# Patient Record
Sex: MALE | Race: WHITE | NOT HISPANIC OR LATINO | Employment: OTHER | URBAN - NONMETROPOLITAN AREA
[De-identification: names, ages, dates, MRNs, and addresses within clinical notes are randomized per-mention and may not be internally consistent; named-entity substitution may affect disease eponyms.]

---

## 2022-02-26 ENCOUNTER — APPOINTMENT (OUTPATIENT)
Dept: MRI IMAGING | Facility: HOSPITAL | Age: 67
End: 2022-02-26

## 2022-02-26 ENCOUNTER — HOSPITAL ENCOUNTER (INPATIENT)
Facility: HOSPITAL | Age: 67
LOS: 5 days | Discharge: HOME | End: 2022-03-03
Attending: PSYCHIATRY & NEUROLOGY | Admitting: PSYCHIATRY & NEUROLOGY

## 2022-02-26 DIAGNOSIS — I63.319 CEREBROVASCULAR ACCIDENT (CVA) DUE TO THROMBOSIS OF MIDDLE CEREBRAL ARTERY, UNSPECIFIED BLOOD VESSEL LATERALITY: Primary | ICD-10-CM

## 2022-02-26 DIAGNOSIS — Z74.09 IMPAIRED MOBILITY AND ADLS: ICD-10-CM

## 2022-02-26 DIAGNOSIS — I67.858 OTHER HEREDITARY CEREBROVASCULAR DISEASE: ICD-10-CM

## 2022-02-26 DIAGNOSIS — Z74.09 IMPAIRED FUNCTIONAL MOBILITY, BALANCE, GAIT, AND ENDURANCE: ICD-10-CM

## 2022-02-26 DIAGNOSIS — Z78.9 IMPAIRED MOBILITY AND ADLS: ICD-10-CM

## 2022-02-26 DIAGNOSIS — R13.10 DYSPHAGIA, UNSPECIFIED TYPE: ICD-10-CM

## 2022-02-26 PROBLEM — E11.9 TYPE 2 DIABETES MELLITUS WITHOUT COMPLICATION, WITH LONG-TERM CURRENT USE OF INSULIN: Status: ACTIVE | Noted: 2022-02-26

## 2022-02-26 PROBLEM — F03.918 DEMENTIA WITH BEHAVIORAL PROBLEM: Status: ACTIVE | Noted: 2022-02-26

## 2022-02-26 PROBLEM — F41.9 ANXIETY DISORDER: Status: ACTIVE | Noted: 2022-02-26

## 2022-02-26 PROBLEM — F02.818 DEMENTIA ASSOCIATED WITH OTHER UNDERLYING DISEASE WITH BEHAVIORAL DISTURBANCE: Status: ACTIVE | Noted: 2022-02-26

## 2022-02-26 PROBLEM — Z79.4 TYPE 2 DIABETES MELLITUS WITHOUT COMPLICATION, WITH LONG-TERM CURRENT USE OF INSULIN: Status: ACTIVE | Noted: 2022-02-26

## 2022-02-26 PROBLEM — F06.2 PSYCHOTIC DISORDER DUE TO ANOTHER MEDICAL CONDITION WITH DELUSIONS: Status: ACTIVE | Noted: 2022-02-26

## 2022-02-26 PROBLEM — E78.5 HYPERLIPEMIA: Status: ACTIVE | Noted: 2022-02-26

## 2022-02-26 PROBLEM — Z62.819 HISTORY OF ABUSE IN CHILDHOOD: Status: ACTIVE | Noted: 2022-02-26

## 2022-02-26 PROBLEM — I10 HTN (HYPERTENSION): Status: ACTIVE | Noted: 2022-02-26

## 2022-02-26 LAB
BILIRUB UR QL STRIP: NEGATIVE
CHOLEST SERPL-MCNC: 156 MG/DL (ref 0–200)
CLARITY UR: CLEAR
COLOR UR: YELLOW
GLUCOSE BLDC GLUCOMTR-MCNC: 105 MG/DL (ref 70–130)
GLUCOSE BLDC GLUCOMTR-MCNC: 111 MG/DL (ref 70–130)
GLUCOSE BLDC GLUCOMTR-MCNC: 160 MG/DL (ref 70–130)
GLUCOSE BLDC GLUCOMTR-MCNC: 260 MG/DL (ref 70–130)
GLUCOSE P FAST SERPL-MCNC: 113 MG/DL (ref 74–106)
GLUCOSE UR STRIP-MCNC: ABNORMAL MG/DL
HDLC SERPL-MCNC: 35 MG/DL (ref 40–60)
HGB UR QL STRIP.AUTO: NEGATIVE
KETONES UR QL STRIP: NEGATIVE
LDLC SERPL CALC-MCNC: 101 MG/DL (ref 0–100)
LDLC/HDLC SERPL: 2.85 {RATIO}
LEUKOCYTE ESTERASE UR QL STRIP.AUTO: NEGATIVE
NITRITE UR QL STRIP: NEGATIVE
PH UR STRIP.AUTO: 6.5 [PH] (ref 5–9)
PROT UR QL STRIP: NEGATIVE
SP GR UR STRIP: 1.02 (ref 1–1.03)
T4 FREE SERPL-MCNC: 1.1 NG/DL (ref 0.93–1.7)
TRIGL SERPL-MCNC: 106 MG/DL (ref 0–150)
TSH SERPL DL<=0.05 MIU/L-ACNC: 1.7 UIU/ML (ref 0.27–4.2)
UROBILINOGEN UR QL STRIP: ABNORMAL
VLDLC SERPL-MCNC: 20 MG/DL (ref 5–40)

## 2022-02-26 PROCEDURE — 82962 GLUCOSE BLOOD TEST: CPT

## 2022-02-26 PROCEDURE — 80061 LIPID PANEL: CPT | Performed by: PSYCHIATRY & NEUROLOGY

## 2022-02-26 PROCEDURE — 63710000001 INSULIN ASPART PER 5 UNITS: Performed by: PSYCHIATRY & NEUROLOGY

## 2022-02-26 PROCEDURE — 84439 ASSAY OF FREE THYROXINE: CPT | Performed by: PSYCHIATRY & NEUROLOGY

## 2022-02-26 PROCEDURE — 81003 URINALYSIS AUTO W/O SCOPE: CPT | Performed by: PSYCHIATRY & NEUROLOGY

## 2022-02-26 PROCEDURE — 90846 FAMILY PSYTX W/O PT 50 MIN: CPT | Performed by: PSYCHIATRY & NEUROLOGY

## 2022-02-26 PROCEDURE — 84443 ASSAY THYROID STIM HORMONE: CPT | Performed by: PSYCHIATRY & NEUROLOGY

## 2022-02-26 PROCEDURE — 82947 ASSAY GLUCOSE BLOOD QUANT: CPT | Performed by: PSYCHIATRY & NEUROLOGY

## 2022-02-26 PROCEDURE — 70551 MRI BRAIN STEM W/O DYE: CPT

## 2022-02-26 PROCEDURE — 99223 1ST HOSP IP/OBS HIGH 75: CPT | Performed by: PSYCHIATRY & NEUROLOGY

## 2022-02-26 RX ORDER — MEMANTINE HYDROCHLORIDE 10 MG/1
10 TABLET ORAL 2 TIMES DAILY
COMMUNITY

## 2022-02-26 RX ORDER — LISINOPRIL 10 MG/1
10 TABLET ORAL DAILY
COMMUNITY
End: 2022-03-10 | Stop reason: HOSPADM

## 2022-02-26 RX ORDER — NICOTINE POLACRILEX 4 MG
15 LOZENGE BUCCAL
Status: DISCONTINUED | OUTPATIENT
Start: 2022-02-26 | End: 2022-03-03 | Stop reason: HOSPADM

## 2022-02-26 RX ORDER — GLIPIZIDE 5 MG/1
5 TABLET ORAL
Status: DISCONTINUED | OUTPATIENT
Start: 2022-02-26 | End: 2022-03-03 | Stop reason: HOSPADM

## 2022-02-26 RX ORDER — CETIRIZINE HYDROCHLORIDE 10 MG/1
10 TABLET ORAL DAILY
COMMUNITY
End: 2022-03-03 | Stop reason: HOSPADM

## 2022-02-26 RX ORDER — GLIPIZIDE 5 MG/1
5 TABLET ORAL
COMMUNITY
End: 2022-03-10 | Stop reason: HOSPADM

## 2022-02-26 RX ORDER — SERTRALINE HYDROCHLORIDE 25 MG/1
25 TABLET, FILM COATED ORAL DAILY
COMMUNITY
End: 2022-03-03 | Stop reason: HOSPADM

## 2022-02-26 RX ORDER — ASPIRIN 81 MG/1
81 TABLET, CHEWABLE ORAL DAILY
Status: DISCONTINUED | OUTPATIENT
Start: 2022-02-26 | End: 2022-03-03 | Stop reason: HOSPADM

## 2022-02-26 RX ORDER — ATORVASTATIN CALCIUM 20 MG/1
20 TABLET, FILM COATED ORAL DAILY
COMMUNITY
End: 2022-03-03 | Stop reason: HOSPADM

## 2022-02-26 RX ORDER — INSULIN GLARGINE 100 [IU]/ML
25 INJECTION, SOLUTION SUBCUTANEOUS NIGHTLY
COMMUNITY
End: 2022-03-03 | Stop reason: HOSPADM

## 2022-02-26 RX ORDER — ALUMINA, MAGNESIA, AND SIMETHICONE 2400; 2400; 240 MG/30ML; MG/30ML; MG/30ML
15 SUSPENSION ORAL EVERY 6 HOURS PRN
Status: DISCONTINUED | OUTPATIENT
Start: 2022-02-26 | End: 2022-03-03 | Stop reason: HOSPADM

## 2022-02-26 RX ORDER — LANOLIN ALCOHOL/MO/W.PET/CERES
1.5 CREAM (GRAM) TOPICAL NIGHTLY
Status: DISCONTINUED | OUTPATIENT
Start: 2022-02-26 | End: 2022-03-03 | Stop reason: HOSPADM

## 2022-02-26 RX ORDER — ONDANSETRON 4 MG/1
4 TABLET, FILM COATED ORAL EVERY 6 HOURS PRN
Status: DISCONTINUED | OUTPATIENT
Start: 2022-02-26 | End: 2022-03-03 | Stop reason: HOSPADM

## 2022-02-26 RX ORDER — LOPERAMIDE HYDROCHLORIDE 2 MG/1
2 CAPSULE ORAL
Status: DISCONTINUED | OUTPATIENT
Start: 2022-02-26 | End: 2022-03-03 | Stop reason: HOSPADM

## 2022-02-26 RX ORDER — INSULIN GLARGINE 100 [IU]/ML
20 INJECTION, SOLUTION SUBCUTANEOUS DAILY
COMMUNITY
End: 2022-03-03 | Stop reason: HOSPADM

## 2022-02-26 RX ORDER — SERTRALINE HYDROCHLORIDE 25 MG/1
25 TABLET, FILM COATED ORAL DAILY
Status: DISCONTINUED | OUTPATIENT
Start: 2022-02-26 | End: 2022-03-01

## 2022-02-26 RX ORDER — ASPIRIN 81 MG/1
81 TABLET, CHEWABLE ORAL DAILY
COMMUNITY

## 2022-02-26 RX ORDER — CLONIDINE HYDROCHLORIDE 0.1 MG/1
0.1 TABLET ORAL EVERY 4 HOURS PRN
Status: DISCONTINUED | OUTPATIENT
Start: 2022-02-26 | End: 2022-03-03 | Stop reason: HOSPADM

## 2022-02-26 RX ORDER — LORAZEPAM 2 MG/1
2 TABLET ORAL
COMMUNITY
End: 2022-03-03 | Stop reason: HOSPADM

## 2022-02-26 RX ORDER — CANAGLIFLOZIN 300 MG/1
300 TABLET, FILM COATED ORAL DAILY
Status: ON HOLD | COMMUNITY
End: 2022-03-07

## 2022-02-26 RX ORDER — MEMANTINE HYDROCHLORIDE 5 MG/1
10 TABLET ORAL EVERY 12 HOURS SCHEDULED
Status: DISCONTINUED | OUTPATIENT
Start: 2022-02-26 | End: 2022-03-03 | Stop reason: HOSPADM

## 2022-02-26 RX ORDER — ATORVASTATIN CALCIUM 20 MG/1
20 TABLET, FILM COATED ORAL DAILY
Status: DISCONTINUED | OUTPATIENT
Start: 2022-02-26 | End: 2022-02-28

## 2022-02-26 RX ORDER — RISPERIDONE 0.25 MG/1
0.25 TABLET ORAL NIGHTLY
Status: DISCONTINUED | OUTPATIENT
Start: 2022-02-26 | End: 2022-03-03 | Stop reason: HOSPADM

## 2022-02-26 RX ORDER — DONEPEZIL HYDROCHLORIDE 10 MG/1
10 TABLET, FILM COATED ORAL DAILY
COMMUNITY

## 2022-02-26 RX ORDER — DONEPEZIL HYDROCHLORIDE 10 MG/1
10 TABLET, FILM COATED ORAL DAILY
Status: DISCONTINUED | OUTPATIENT
Start: 2022-02-26 | End: 2022-03-03 | Stop reason: HOSPADM

## 2022-02-26 RX ORDER — LISINOPRIL 10 MG/1
10 TABLET ORAL DAILY
Status: DISCONTINUED | OUTPATIENT
Start: 2022-02-26 | End: 2022-03-03 | Stop reason: HOSPADM

## 2022-02-26 RX ORDER — DEXTROSE MONOHYDRATE 25 G/50ML
25 INJECTION, SOLUTION INTRAVENOUS
Status: DISCONTINUED | OUTPATIENT
Start: 2022-02-26 | End: 2022-03-03 | Stop reason: HOSPADM

## 2022-02-26 RX ADMIN — ATORVASTATIN CALCIUM 20 MG: 20 TABLET, FILM COATED ORAL at 14:41

## 2022-02-26 RX ADMIN — ASPIRIN 81 MG: 81 TABLET, CHEWABLE ORAL at 14:41

## 2022-02-26 RX ADMIN — DONEPEZIL HYDROCHLORIDE 10 MG: 10 TABLET, FILM COATED ORAL at 14:41

## 2022-02-26 RX ADMIN — MEMANTINE 10 MG: 5 TABLET ORAL at 14:41

## 2022-02-26 RX ADMIN — METFORMIN HYDROCHLORIDE 1000 MG: 500 TABLET ORAL at 17:27

## 2022-02-26 RX ADMIN — RISPERIDONE 0.25 MG: 0.25 TABLET ORAL at 21:03

## 2022-02-26 RX ADMIN — INSULIN ASPART 2 UNITS: 100 INJECTION, SOLUTION INTRAVENOUS; SUBCUTANEOUS at 17:27

## 2022-02-26 RX ADMIN — GLIPIZIDE 5 MG: 5 TABLET ORAL at 17:27

## 2022-02-26 RX ADMIN — INSULIN ASPART 6 UNITS: 100 INJECTION, SOLUTION INTRAVENOUS; SUBCUTANEOUS at 14:44

## 2022-02-26 RX ADMIN — SERTRALINE HYDROCHLORIDE 25 MG: 25 TABLET ORAL at 14:41

## 2022-02-26 RX ADMIN — MELATONIN 1.5 MG: 3 TAB ORAL at 21:03

## 2022-02-26 RX ADMIN — CANAGLIFLOZIN 300 MG: 300 TABLET, FILM COATED ORAL at 14:43

## 2022-02-26 RX ADMIN — LISINOPRIL 10 MG: 10 TABLET ORAL at 14:41

## 2022-02-26 RX ADMIN — MEMANTINE 10 MG: 5 TABLET ORAL at 21:03

## 2022-02-27 ENCOUNTER — APPOINTMENT (OUTPATIENT)
Dept: CT IMAGING | Facility: HOSPITAL | Age: 67
End: 2022-02-27

## 2022-02-27 LAB
25(OH)D3 SERPL-MCNC: 12.7 NG/ML (ref 30–100)
ALBUMIN SERPL-MCNC: 4.1 G/DL (ref 3.5–5.2)
ALBUMIN/GLOB SERPL: 1.8 G/DL
ALP SERPL-CCNC: 138 U/L (ref 39–117)
ALT SERPL W P-5'-P-CCNC: 25 U/L (ref 1–41)
ANION GAP SERPL CALCULATED.3IONS-SCNC: 12 MMOL/L (ref 5–15)
AST SERPL-CCNC: 30 U/L (ref 1–40)
BASOPHILS # BLD AUTO: 0.07 10*3/MM3 (ref 0–0.2)
BASOPHILS NFR BLD AUTO: 1 % (ref 0–1.5)
BILIRUB SERPL-MCNC: 0.7 MG/DL (ref 0–1.2)
BUN SERPL-MCNC: 12 MG/DL (ref 8–23)
BUN/CREAT SERPL: 15.8 (ref 7–25)
CALCIUM SPEC-SCNC: 9.6 MG/DL (ref 8.6–10.5)
CHLORIDE SERPL-SCNC: 99 MMOL/L (ref 98–107)
CO2 SERPL-SCNC: 27 MMOL/L (ref 22–29)
CREAT SERPL-MCNC: 0.76 MG/DL (ref 0.76–1.27)
DEPRECATED RDW RBC AUTO: 39.2 FL (ref 37–54)
EOSINOPHIL # BLD AUTO: 0.39 10*3/MM3 (ref 0–0.4)
EOSINOPHIL NFR BLD AUTO: 5.4 % (ref 0.3–6.2)
ERYTHROCYTE [DISTWIDTH] IN BLOOD BY AUTOMATED COUNT: 12.6 % (ref 12.3–15.4)
FOLATE SERPL-MCNC: 10.8 NG/ML (ref 4.78–24.2)
GFR SERPL CREATININE-BSD FRML MDRD: 103 ML/MIN/1.73
GLOBULIN UR ELPH-MCNC: 2.3 GM/DL
GLUCOSE BLDC GLUCOMTR-MCNC: 100 MG/DL (ref 70–130)
GLUCOSE BLDC GLUCOMTR-MCNC: 154 MG/DL (ref 70–130)
GLUCOSE BLDC GLUCOMTR-MCNC: 186 MG/DL (ref 70–130)
GLUCOSE BLDC GLUCOMTR-MCNC: 211 MG/DL (ref 70–130)
GLUCOSE SERPL-MCNC: 106 MG/DL (ref 65–99)
HCT VFR BLD AUTO: 42.7 % (ref 37.5–51)
HGB BLD-MCNC: 15 G/DL (ref 13–17.7)
IMM GRANULOCYTES # BLD AUTO: 0.03 10*3/MM3 (ref 0–0.05)
IMM GRANULOCYTES NFR BLD AUTO: 0.4 % (ref 0–0.5)
LYMPHOCYTES # BLD AUTO: 1.56 10*3/MM3 (ref 0.7–3.1)
LYMPHOCYTES NFR BLD AUTO: 21.4 % (ref 19.6–45.3)
MCH RBC QN AUTO: 30.2 PG (ref 26.6–33)
MCHC RBC AUTO-ENTMCNC: 35.1 G/DL (ref 31.5–35.7)
MCV RBC AUTO: 86.1 FL (ref 79–97)
MONOCYTES # BLD AUTO: 0.78 10*3/MM3 (ref 0.1–0.9)
MONOCYTES NFR BLD AUTO: 10.7 % (ref 5–12)
NEUTROPHILS NFR BLD AUTO: 4.45 10*3/MM3 (ref 1.7–7)
NEUTROPHILS NFR BLD AUTO: 61.1 % (ref 42.7–76)
NRBC BLD AUTO-RTO: 0 /100 WBC (ref 0–0.2)
PLATELET # BLD AUTO: 290 10*3/MM3 (ref 140–450)
PMV BLD AUTO: 9.4 FL (ref 6–12)
POTASSIUM SERPL-SCNC: 4.8 MMOL/L (ref 3.5–5.2)
PROT SERPL-MCNC: 6.4 G/DL (ref 6–8.5)
RBC # BLD AUTO: 4.96 10*6/MM3 (ref 4.14–5.8)
SODIUM SERPL-SCNC: 138 MMOL/L (ref 136–145)
VIT B12 BLD-MCNC: 1119 PG/ML (ref 211–946)
WBC NRBC COR # BLD: 7.28 10*3/MM3 (ref 3.4–10.8)

## 2022-02-27 PROCEDURE — 82746 ASSAY OF FOLIC ACID SERUM: CPT | Performed by: PSYCHIATRY & NEUROLOGY

## 2022-02-27 PROCEDURE — 82607 VITAMIN B-12: CPT | Performed by: PSYCHIATRY & NEUROLOGY

## 2022-02-27 PROCEDURE — 85025 COMPLETE CBC W/AUTO DIFF WBC: CPT | Performed by: PSYCHIATRY & NEUROLOGY

## 2022-02-27 PROCEDURE — 82962 GLUCOSE BLOOD TEST: CPT

## 2022-02-27 PROCEDURE — 80053 COMPREHEN METABOLIC PANEL: CPT | Performed by: PSYCHIATRY & NEUROLOGY

## 2022-02-27 PROCEDURE — 83036 HEMOGLOBIN GLYCOSYLATED A1C: CPT | Performed by: NURSE PRACTITIONER

## 2022-02-27 PROCEDURE — 99232 SBSQ HOSP IP/OBS MODERATE 35: CPT | Performed by: PSYCHIATRY & NEUROLOGY

## 2022-02-27 PROCEDURE — 70450 CT HEAD/BRAIN W/O DYE: CPT

## 2022-02-27 PROCEDURE — 82306 VITAMIN D 25 HYDROXY: CPT | Performed by: PSYCHIATRY & NEUROLOGY

## 2022-02-27 PROCEDURE — 80061 LIPID PANEL: CPT | Performed by: NURSE PRACTITIONER

## 2022-02-27 PROCEDURE — 63710000001 INSULIN ASPART PER 5 UNITS: Performed by: PSYCHIATRY & NEUROLOGY

## 2022-02-27 RX ADMIN — MEMANTINE 10 MG: 5 TABLET ORAL at 08:46

## 2022-02-27 RX ADMIN — DONEPEZIL HYDROCHLORIDE 10 MG: 10 TABLET, FILM COATED ORAL at 08:46

## 2022-02-27 RX ADMIN — RISPERIDONE 0.25 MG: 0.25 TABLET ORAL at 20:36

## 2022-02-27 RX ADMIN — GLIPIZIDE 5 MG: 5 TABLET ORAL at 08:46

## 2022-02-27 RX ADMIN — GLIPIZIDE 5 MG: 5 TABLET ORAL at 17:17

## 2022-02-27 RX ADMIN — CANAGLIFLOZIN 300 MG: 300 TABLET, FILM COATED ORAL at 08:52

## 2022-02-27 RX ADMIN — MELATONIN 1.5 MG: 3 TAB ORAL at 20:36

## 2022-02-27 RX ADMIN — INSULIN ASPART 4 UNITS: 100 INJECTION, SOLUTION INTRAVENOUS; SUBCUTANEOUS at 11:38

## 2022-02-27 RX ADMIN — MEMANTINE 10 MG: 5 TABLET ORAL at 20:36

## 2022-02-27 RX ADMIN — ATORVASTATIN CALCIUM 20 MG: 20 TABLET, FILM COATED ORAL at 08:46

## 2022-02-27 RX ADMIN — METFORMIN HYDROCHLORIDE 1000 MG: 500 TABLET ORAL at 08:45

## 2022-02-27 RX ADMIN — ASPIRIN 81 MG: 81 TABLET, CHEWABLE ORAL at 08:46

## 2022-02-27 RX ADMIN — OFLOXACIN 50000 UNITS: 300 TABLET, COATED ORAL at 17:17

## 2022-02-27 RX ADMIN — SERTRALINE HYDROCHLORIDE 25 MG: 25 TABLET ORAL at 08:46

## 2022-02-27 RX ADMIN — METFORMIN HYDROCHLORIDE 1000 MG: 500 TABLET ORAL at 17:17

## 2022-02-27 RX ADMIN — LISINOPRIL 10 MG: 10 TABLET ORAL at 08:46

## 2022-02-28 ENCOUNTER — APPOINTMENT (OUTPATIENT)
Dept: MRI IMAGING | Facility: HOSPITAL | Age: 67
End: 2022-02-28

## 2022-02-28 ENCOUNTER — APPOINTMENT (OUTPATIENT)
Dept: CT IMAGING | Facility: HOSPITAL | Age: 67
End: 2022-02-28

## 2022-02-28 LAB
BH CV ECHO MEAS - ACS: 2 CM
BH CV ECHO MEAS - AO MAX PG (FULL): 1.9 MMHG
BH CV ECHO MEAS - AO MAX PG: 4.2 MMHG
BH CV ECHO MEAS - AO MEAN PG (FULL): 1 MMHG
BH CV ECHO MEAS - AO MEAN PG: 2 MMHG
BH CV ECHO MEAS - AO ROOT AREA (BSA CORRECTED): 1.9
BH CV ECHO MEAS - AO ROOT AREA: 10.2 CM^2
BH CV ECHO MEAS - AO ROOT DIAM: 3.6 CM
BH CV ECHO MEAS - AO V2 MAX: 102 CM/SEC
BH CV ECHO MEAS - AO V2 MEAN: 69.1 CM/SEC
BH CV ECHO MEAS - AO V2 VTI: 14.1 CM
BH CV ECHO MEAS - ASC AORTA: 3.1 CM
BH CV ECHO MEAS - AVA(I,A): 2.3 CM^2
BH CV ECHO MEAS - AVA(I,D): 2.3 CM^2
BH CV ECHO MEAS - AVA(V,A): 2.3 CM^2
BH CV ECHO MEAS - AVA(V,D): 2.3 CM^2
BH CV ECHO MEAS - BSA(HAYCOCK): 1.9 M^2
BH CV ECHO MEAS - BSA: 1.9 M^2
BH CV ECHO MEAS - BZI_BMI: 26.6 KILOGRAMS/M^2
BH CV ECHO MEAS - BZI_METRIC_HEIGHT: 170.2 CM
BH CV ECHO MEAS - BZI_METRIC_WEIGHT: 77.1 KG
BH CV ECHO MEAS - EDV(CUBED): 85.8 ML
BH CV ECHO MEAS - EDV(MOD-SP2): 87.4 ML
BH CV ECHO MEAS - EDV(MOD-SP4): 99.7 ML
BH CV ECHO MEAS - EDV(TEICH): 88.2 ML
BH CV ECHO MEAS - EF(CUBED): 75.8 %
BH CV ECHO MEAS - EF(MOD-SP2): 59.6 %
BH CV ECHO MEAS - EF(MOD-SP4): 67.6 %
BH CV ECHO MEAS - EF(TEICH): 67.9 %
BH CV ECHO MEAS - ESV(CUBED): 20.8 ML
BH CV ECHO MEAS - ESV(MOD-SP2): 35.3 ML
BH CV ECHO MEAS - ESV(MOD-SP4): 32.3 ML
BH CV ECHO MEAS - ESV(TEICH): 28.3 ML
BH CV ECHO MEAS - FS: 37.6 %
BH CV ECHO MEAS - IVS/LVPW: 1.1
BH CV ECHO MEAS - IVSD: 1.2 CM
BH CV ECHO MEAS - LA DIMENSION: 3.7 CM
BH CV ECHO MEAS - LA/AO: 1
BH CV ECHO MEAS - LV DIASTOLIC VOL/BSA (35-75): 52.8 ML/M^2
BH CV ECHO MEAS - LV MASS(C)D: 167.4 GRAMS
BH CV ECHO MEAS - LV MASS(C)DI: 88.7 GRAMS/M^2
BH CV ECHO MEAS - LV MAX PG: 2.3 MMHG
BH CV ECHO MEAS - LV MEAN PG: 1 MMHG
BH CV ECHO MEAS - LV SYSTOLIC VOL/BSA (12-30): 17.1 ML/M^2
BH CV ECHO MEAS - LV V1 MAX: 75.7 CM/SEC
BH CV ECHO MEAS - LV V1 MEAN: 54.5 CM/SEC
BH CV ECHO MEAS - LV V1 VTI: 10.5 CM
BH CV ECHO MEAS - LVIDD: 4.4 CM
BH CV ECHO MEAS - LVIDS: 2.8 CM
BH CV ECHO MEAS - LVLD AP2: 7.6 CM
BH CV ECHO MEAS - LVLD AP4: 7.4 CM
BH CV ECHO MEAS - LVLS AP2: 6 CM
BH CV ECHO MEAS - LVLS AP4: 6 CM
BH CV ECHO MEAS - LVOT AREA (M): 3.1 CM^2
BH CV ECHO MEAS - LVOT AREA: 3.1 CM^2
BH CV ECHO MEAS - LVOT DIAM: 2 CM
BH CV ECHO MEAS - LVPWD: 1 CM
BH CV ECHO MEAS - MV A MAX VEL: 83.1 CM/SEC
BH CV ECHO MEAS - MV DEC SLOPE: 395 CM/SEC^2
BH CV ECHO MEAS - MV E MAX VEL: 53.6 CM/SEC
BH CV ECHO MEAS - MV E/A: 0.65
BH CV ECHO MEAS - MV MAX PG: 5.4 MMHG
BH CV ECHO MEAS - MV MEAN PG: 2 MMHG
BH CV ECHO MEAS - MV P1/2T MAX VEL: 83 CM/SEC
BH CV ECHO MEAS - MV P1/2T: 61.5 MSEC
BH CV ECHO MEAS - MV V2 MAX: 116 CM/SEC
BH CV ECHO MEAS - MV V2 MEAN: 63.7 CM/SEC
BH CV ECHO MEAS - MV V2 VTI: 18.5 CM
BH CV ECHO MEAS - MVA P1/2T LCG: 2.7 CM^2
BH CV ECHO MEAS - MVA(P1/2T): 3.6 CM^2
BH CV ECHO MEAS - MVA(VTI): 1.8 CM^2
BH CV ECHO MEAS - PA MAX PG: 3.8 MMHG
BH CV ECHO MEAS - PA V2 MAX: 97.7 CM/SEC
BH CV ECHO MEAS - RAP SYSTOLE: 10 MMHG
BH CV ECHO MEAS - RVDD: 2.9 CM
BH CV ECHO MEAS - RVSP: 28.8 MMHG
BH CV ECHO MEAS - SI(AO): 76.1 ML/M^2
BH CV ECHO MEAS - SI(CUBED): 34.4 ML/M^2
BH CV ECHO MEAS - SI(LVOT): 17.5 ML/M^2
BH CV ECHO MEAS - SI(MOD-SP2): 27.6 ML/M^2
BH CV ECHO MEAS - SI(MOD-SP4): 35.7 ML/M^2
BH CV ECHO MEAS - SI(TEICH): 31.7 ML/M^2
BH CV ECHO MEAS - SV(AO): 143.5 ML
BH CV ECHO MEAS - SV(CUBED): 65 ML
BH CV ECHO MEAS - SV(LVOT): 33 ML
BH CV ECHO MEAS - SV(MOD-SP2): 52.1 ML
BH CV ECHO MEAS - SV(MOD-SP4): 67.4 ML
BH CV ECHO MEAS - SV(TEICH): 59.9 ML
BH CV ECHO MEAS - TR MAX VEL: 217 CM/SEC
CHOLEST SERPL-MCNC: 144 MG/DL (ref 0–200)
GLUCOSE BLDC GLUCOMTR-MCNC: 145 MG/DL (ref 70–130)
GLUCOSE BLDC GLUCOMTR-MCNC: 171 MG/DL (ref 70–130)
GLUCOSE BLDC GLUCOMTR-MCNC: 177 MG/DL (ref 70–130)
GLUCOSE BLDC GLUCOMTR-MCNC: 305 MG/DL (ref 70–130)
HBA1C MFR BLD: 7.5 % (ref 4.8–5.6)
HDLC SERPL-MCNC: 30 MG/DL (ref 40–60)
LDLC SERPL CALC-MCNC: 94 MG/DL (ref 0–100)
LDLC/HDLC SERPL: 3.08 {RATIO}
LV EF 2D ECHO EST: 58 %
MAXIMAL PREDICTED HEART RATE: 156 BPM
STRESS TARGET HR: 133 BPM
TRIGL SERPL-MCNC: 108 MG/DL (ref 0–150)
VLDLC SERPL-MCNC: 20 MG/DL (ref 5–40)

## 2022-02-28 PROCEDURE — 70498 CT ANGIOGRAPHY NECK: CPT

## 2022-02-28 PROCEDURE — 25010000002 GADOTERIDOL PER 1 ML: Performed by: PSYCHIATRY & NEUROLOGY

## 2022-02-28 PROCEDURE — 70496 CT ANGIOGRAPHY HEAD: CPT

## 2022-02-28 PROCEDURE — 0 IOPAMIDOL PER 1 ML: Performed by: PSYCHIATRY & NEUROLOGY

## 2022-02-28 PROCEDURE — 70553 MRI BRAIN STEM W/O & W/DYE: CPT

## 2022-02-28 PROCEDURE — A9579 GAD-BASE MR CONTRAST NOS,1ML: HCPCS | Performed by: PSYCHIATRY & NEUROLOGY

## 2022-02-28 PROCEDURE — 63710000001 INSULIN ASPART PER 5 UNITS: Performed by: PSYCHIATRY & NEUROLOGY

## 2022-02-28 PROCEDURE — 99232 SBSQ HOSP IP/OBS MODERATE 35: CPT | Performed by: PSYCHIATRY & NEUROLOGY

## 2022-02-28 PROCEDURE — 99223 1ST HOSP IP/OBS HIGH 75: CPT | Performed by: NURSE PRACTITIONER

## 2022-02-28 PROCEDURE — 82962 GLUCOSE BLOOD TEST: CPT

## 2022-02-28 PROCEDURE — 92610 EVALUATE SWALLOWING FUNCTION: CPT | Performed by: SPEECH-LANGUAGE PATHOLOGIST

## 2022-02-28 RX ORDER — CLOPIDOGREL BISULFATE 75 MG/1
75 TABLET ORAL DAILY
Status: DISCONTINUED | OUTPATIENT
Start: 2022-02-28 | End: 2022-03-03 | Stop reason: HOSPADM

## 2022-02-28 RX ORDER — SODIUM CHLORIDE 0.9 % (FLUSH) 0.9 %
10 SYRINGE (ML) INJECTION EVERY 12 HOURS SCHEDULED
Status: DISCONTINUED | OUTPATIENT
Start: 2022-02-28 | End: 2022-02-28

## 2022-02-28 RX ORDER — ATORVASTATIN CALCIUM 40 MG/1
40 TABLET, FILM COATED ORAL DAILY
Status: DISCONTINUED | OUTPATIENT
Start: 2022-03-01 | End: 2022-03-03 | Stop reason: HOSPADM

## 2022-02-28 RX ORDER — SODIUM CHLORIDE 0.9 % (FLUSH) 0.9 %
10 SYRINGE (ML) INJECTION AS NEEDED
Status: DISCONTINUED | OUTPATIENT
Start: 2022-02-28 | End: 2022-02-28

## 2022-02-28 RX ADMIN — METFORMIN HYDROCHLORIDE 1000 MG: 500 TABLET ORAL at 08:24

## 2022-02-28 RX ADMIN — MEMANTINE 10 MG: 5 TABLET ORAL at 20:21

## 2022-02-28 RX ADMIN — LISINOPRIL 10 MG: 10 TABLET ORAL at 08:24

## 2022-02-28 RX ADMIN — RISPERIDONE 0.25 MG: 0.25 TABLET ORAL at 20:21

## 2022-02-28 RX ADMIN — ATORVASTATIN CALCIUM 20 MG: 20 TABLET, FILM COATED ORAL at 08:24

## 2022-02-28 RX ADMIN — MEMANTINE 10 MG: 5 TABLET ORAL at 08:24

## 2022-02-28 RX ADMIN — DONEPEZIL HYDROCHLORIDE 10 MG: 10 TABLET, FILM COATED ORAL at 08:24

## 2022-02-28 RX ADMIN — ASPIRIN 81 MG: 81 TABLET, CHEWABLE ORAL at 08:24

## 2022-02-28 RX ADMIN — INSULIN ASPART 2 UNITS: 100 INJECTION, SOLUTION INTRAVENOUS; SUBCUTANEOUS at 11:53

## 2022-02-28 RX ADMIN — SERTRALINE HYDROCHLORIDE 25 MG: 25 TABLET ORAL at 08:24

## 2022-02-28 RX ADMIN — GADOTERIDOL 17 ML: 279.3 INJECTION, SOLUTION INTRAVENOUS at 09:33

## 2022-02-28 RX ADMIN — MELATONIN 1.5 MG: 3 TAB ORAL at 20:21

## 2022-02-28 RX ADMIN — IOPAMIDOL 90 ML: 755 INJECTION, SOLUTION INTRAVENOUS at 12:52

## 2022-02-28 RX ADMIN — INSULIN ASPART 2 UNITS: 100 INJECTION, SOLUTION INTRAVENOUS; SUBCUTANEOUS at 08:23

## 2022-02-28 RX ADMIN — INSULIN ASPART 2 UNITS: 100 INJECTION, SOLUTION INTRAVENOUS; SUBCUTANEOUS at 16:57

## 2022-02-28 RX ADMIN — GLIPIZIDE 5 MG: 5 TABLET ORAL at 17:01

## 2022-02-28 RX ADMIN — CLOPIDOGREL 75 MG: 75 TABLET, FILM COATED ORAL at 15:50

## 2022-02-28 RX ADMIN — CANAGLIFLOZIN 300 MG: 300 TABLET, FILM COATED ORAL at 08:24

## 2022-02-28 RX ADMIN — GLIPIZIDE 5 MG: 5 TABLET ORAL at 08:24

## 2022-03-01 LAB
AMMONIA BLD-SCNC: 20 UMOL/L (ref 16–60)
GLUCOSE BLDC GLUCOMTR-MCNC: 155 MG/DL (ref 70–130)
GLUCOSE BLDC GLUCOMTR-MCNC: 192 MG/DL (ref 70–130)
GLUCOSE BLDC GLUCOMTR-MCNC: 328 MG/DL (ref 70–130)
HIV1+2 AB SER QL: NORMAL
RPR SER QL: NORMAL

## 2022-03-01 PROCEDURE — G0432 EIA HIV-1/HIV-2 SCREEN: HCPCS | Performed by: PSYCHIATRY & NEUROLOGY

## 2022-03-01 PROCEDURE — 82962 GLUCOSE BLOOD TEST: CPT

## 2022-03-01 PROCEDURE — 86592 SYPHILIS TEST NON-TREP QUAL: CPT | Performed by: PSYCHIATRY & NEUROLOGY

## 2022-03-01 PROCEDURE — 99232 SBSQ HOSP IP/OBS MODERATE 35: CPT | Performed by: PSYCHIATRY & NEUROLOGY

## 2022-03-01 PROCEDURE — 99232 SBSQ HOSP IP/OBS MODERATE 35: CPT | Performed by: NURSE PRACTITIONER

## 2022-03-01 PROCEDURE — 82140 ASSAY OF AMMONIA: CPT | Performed by: PSYCHIATRY & NEUROLOGY

## 2022-03-01 PROCEDURE — 63710000001 INSULIN ASPART PER 5 UNITS: Performed by: PSYCHIATRY & NEUROLOGY

## 2022-03-01 PROCEDURE — 97166 OT EVAL MOD COMPLEX 45 MIN: CPT

## 2022-03-01 PROCEDURE — 97162 PT EVAL MOD COMPLEX 30 MIN: CPT

## 2022-03-01 RX ADMIN — LISINOPRIL 10 MG: 10 TABLET ORAL at 10:52

## 2022-03-01 RX ADMIN — ASPIRIN 81 MG: 81 TABLET, CHEWABLE ORAL at 08:28

## 2022-03-01 RX ADMIN — ATORVASTATIN CALCIUM 40 MG: 40 TABLET, FILM COATED ORAL at 08:28

## 2022-03-01 RX ADMIN — RISPERIDONE 0.25 MG: 0.25 TABLET ORAL at 20:13

## 2022-03-01 RX ADMIN — INSULIN ASPART 2 UNITS: 100 INJECTION, SOLUTION INTRAVENOUS; SUBCUTANEOUS at 11:54

## 2022-03-01 RX ADMIN — GLIPIZIDE 5 MG: 5 TABLET ORAL at 16:39

## 2022-03-01 RX ADMIN — MEMANTINE 10 MG: 5 TABLET ORAL at 08:28

## 2022-03-01 RX ADMIN — GLIPIZIDE 5 MG: 5 TABLET ORAL at 08:28

## 2022-03-01 RX ADMIN — INSULIN ASPART 2 UNITS: 100 INJECTION, SOLUTION INTRAVENOUS; SUBCUTANEOUS at 16:39

## 2022-03-01 RX ADMIN — MELATONIN 1.5 MG: 3 TAB ORAL at 20:13

## 2022-03-01 RX ADMIN — DONEPEZIL HYDROCHLORIDE 10 MG: 10 TABLET, FILM COATED ORAL at 08:28

## 2022-03-01 RX ADMIN — MEMANTINE 10 MG: 5 TABLET ORAL at 20:13

## 2022-03-01 RX ADMIN — SERTRALINE HYDROCHLORIDE 25 MG: 25 TABLET ORAL at 08:28

## 2022-03-01 RX ADMIN — INSULIN ASPART 7 UNITS: 100 INJECTION, SOLUTION INTRAVENOUS; SUBCUTANEOUS at 08:29

## 2022-03-01 RX ADMIN — CANAGLIFLOZIN 300 MG: 300 TABLET, FILM COATED ORAL at 08:28

## 2022-03-01 RX ADMIN — CLOPIDOGREL 75 MG: 75 TABLET, FILM COATED ORAL at 08:28

## 2022-03-01 NOTE — PROGRESS NOTES
[facility] They report they will follow with patient for up to 8 hours a week once patient is discharged.
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
## 2022-03-01 NOTE — NURSING NOTE
"Pt seen in the common area this am. Pt is alert to person only. Pt is rambling nonsensically and states he needs different clothes to keep \"the crazy people away\". Meds taken whole with instruction to put pills in the mouth and the take a drink of water then to swallow.   "
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
## 2022-03-01 NOTE — PLAN OF CARE
Goal Outcome Evaluation:  Plan of Care Reviewed With: patient           Outcome Summary: OT eval complete, co-eval with PT. Walking in hallway upon arrival. Patient agreeable with evaluations. patient stating he was feeling fearful upon inititally, theraputic support provided to make patient feel safe and comfortable. Patient alert to name, unable to state birthday at this time. Patient does follow 1 step commands ~50% of the time this evaluation, unable to follow 2 step commands. Patient independent in transfers, toileting, and dressing at this evaluation. Patient with difficulties expressing thoughts, phrases, and some word finding issues noted as well. Patient functionally performs welll, cognitive impairments will required patient to need assistance for med management, appointment management, financial management, and supervision for showers. No further OT warranted at this time as patient can manage his self-care skills (except showers). D/C OT.
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
## 2022-03-01 NOTE — PLAN OF CARE
Problem: Adult Behavioral Health Plan of Care  Goal: Plan of Care Review  Outcome: Ongoing, Progressing  Flowsheets (Taken 3/1/2022 1020)  Consent Given to Review Plan with: Contacted patient's spouse Maxine  Progress: improving  Plan of Care Reviewed With: significant other  Patient Agreement with Plan of Care: agrees  Goal: Optimized Coping Skills in Response to Life Stressors  Outcome: Ongoing, Progressing  Intervention: Promote Effective Coping Strategies  Flowsheets (Taken 3/1/2022 1318)  Supportive Measures:   active listening utilized   counseling provided  Goal: Develops/Participates in Therapeutic [place] to Support Successful Transition  Outcome: Ongoing, Progressing  Intervention: Foster Therapeutic [place]  Flowsheets (Taken 2/28/2022 1249)  Trust Relationship/Rapport:   care explained   choices provided   emotional support provided   empathic listening provided   thoughts/feelings acknowledged   reassurance provided   questions answered   questions encouraged  Intervention: Mutually Develop Transition Plan  Flowsheets (Taken 2/28/2022 1249)  Transition Support:   community resources reviewed   follow-up care coordinated   follow-up care discussed   crisis management plan promoted   crisis management plan verbalized    1315:    DATA:      This provider is located at [facility], and the Patient  is seen remotely at [facility]. The patient's condition being treated is appropriate for telehealth services. The provider identified herself as well as her credentials.   The patient has given consent to be seen remotely, and when consent is given they understand that the consent allows for patient identifiable information to be sent to a third party as needed.   They may refuse to be seen remotely at any time. The electronic data is encrypted and password protected, and the patient has been advised of the potential risks to privacy not withstanding such measures.     Therapist met  individually with patient this date to introduce role and to discuss hospitalization expectations. Patient agreeable. Reviewed medical record and staffed case with treatment team this date. No major issues identified.       Clinical Maneuvering/Intervention:     Therapist staffed case with Dr. Floyd and HARJEET Collazo.  Spoke with patient's spouse Maxine for collateral and safety planning.      Allowed patient to freely discuss issues without interruption or judgment. Provided safe, confidential environment to facilitate the development of positive therapeutic relationship and encourage open, honest communication.      ASSESSMENT:      The patient was seen 1-1 for follow up this date. SW intern Henna also present. Patient agreeable. Patient calm/cooperative. Patient oriented to person.  Patient noted to respond to internal stimuli.  Patient points to something on the ground.  Unable to describe. Patient reports that he is feeling depressed.  Patient reports that he wants to see his wife.  Patient talks off topic and looks around the room as if he is seeing something.      Mental Status Exam:    Hygiene:   good  Cooperation:  Cooperative  Eye Contact:  Downcast  Psychomotor Behavior:  Appropriate  Affect:  Restricted  Speech:  Minimal  Disorganized  Thought Content:  Mood congruent  Suicidal:  None  Homicidal:  None  Hallucinations:  Visual  Delusion:  Unable to demonstrate  Memory:  Deficits  Orientation:  Person  Reliability:  poor  Insight:  Poor  Judgement:  Poor  Impulse Control:  Impaired         PLAN:       Patient to remain hospitalized this date.      Treatment team will focus efforts on stabilizing patient's acute symptoms while providing education on healthy coping and crisis management to reduce hospitalizations.   Patient requires daily psychiatrist evaluation and 24/7 nursing supervision to promote patient  safety.     Therapist will offer 1-4 individual sessions, family education, and appropriate  referral.     Therapist recommends outpatient referral. Patient's spouse agreeable to [facility] home health referral.  She has been encouraged also to comply with a [facility] in [facility].  Patient to return home with 24/7 care.  Home is reported to be safe guarded from weapons, medications, and firearms.

## 2022-03-01 NOTE — PROGRESS NOTES
HARJEET sent over neuro referral for patient to [facility] fax [phone] phone [phone]. [facility] reports that they will contact patient in 7-14 business day to set up an appointment.
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
## 2022-03-01 NOTE — PLAN OF CARE
Problem: Adult Behavioral Health Plan of Care  Goal: Plan of Care Review  Outcome: Ongoing, Progressing  Flowsheets (Taken 3/1/2022 1020)  Consent Given to Review Plan with: Contacted patient's spouse Maxine  Progress: improving  Plan of Care Reviewed With: significant other  Patient Agreement with Plan of Care: agrees   Goal Outcome Evaluation:  Plan of Care Reviewed With: significant other  Patient Agreement with Plan of Care: agrees  Consent Given to Review Plan with: Contacted patient's spouse Maxine  Progress: improving  Outcome Summary: New admit. Initiated social history and integrated summary    1010:        Therapist staffed with treatment team. Dr. Floyd encouraging continued attempts to involve spouse in treatment and aftercare.      Therapist contacted patient's spouse Maxine at [phone];  No answer x 2.     1042:  Therapist was able to reach patient's spouse Maxine at [phone]; Maxine was very supportive this date.  She reports that patient currently has home health already setup through [facility].  They come 2x weekly for fours a day; help with cleaning. She reports that currently she monitors medications and adls/bathing. She reports that they can help with these things once patient's symptoms get worse, but currently she can management. She reports that she is trying to keep patient out of nursing home. Maxine reports that patient's home is safe guarded from firearms, medications, and weapons and that she has no safety concerns with patient coming home. Maxine reports belief that they can not manage appointments in [place] and they have trouble driving there. She reports that they don't have help with getting to appointments because everyone works.     Patient has PCP Dr. Margaux Clarke and sees neuro doctor by name at [facility] and both are at [facility]

## 2022-03-01 NOTE — PLAN OF CARE
Goal Outcome Evaluation:  Plan of Care Reviewed With: patient  Patient Agreement with Plan of Care: unable to participate        Outcome Summary: No prns given or needed for behaviors this shift. Pt remains pleasantly confused.
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
## 2022-03-01 NOTE — PLAN OF CARE
Goal Outcome Evaluation:  Plan of Care Reviewed With: patient  Patient Agreement with Plan of Care: agrees     Progress: no change  Outcome Summary: Patient oriented to self only.  Patient cooperative and med compliant.  Patient wandered vidales most of shift and needed some redirection.  Patient observed only getting about an hour of sleep so far this shift.

## 2022-03-01 NOTE — NURSING NOTE
Behavior   Note any precipitants to event or behavior   Describe level and action of any aggressive behavior or speech and associated interventions.     Anxiety: Patient denies at this time  Depression: Patient denies at this time  Pain  0  AVH   no  S/I   no  Plan  no  H/I   no  Plan  no    Affect   blunted    Note:  Patient presents with blunted affect and fair eye contact.  Patient was alert to self only.  Patient was cooperative and med compliant.  Patient was redirectable during shift when needed, such as wondering into other patients rooms, etc.  Patient has been observed being awake for most of shift.  Patient interacted minimally with staff and peers, but was able to answer simple questions.  Patient denies pain, discomfort, or any new medical problems.  Patient voices no concerns at this time.  Will continue to monitor patient for safety.      Intervention    PRN medication utilized:  no    Instructed in medication usage and effects  Medications administered as ordered  Encouraged to verbalize needs      Response    Verbalized understanding   Did patient take medications as ordered yes   Did patient interact with assessment?  yes     Plan    Will monitor for safety  Will monitor every 15 minutes as ordered  Will evaluate and promote the plan of care    Last BM:  unknown date  (Please chart in I/O as well)

## 2022-03-01 NOTE — PLAN OF CARE
Problem: Cognitive Impairment (Dementia Signs/Symptoms)  Goal: Optimized Cognitive Function (Dementia Signs/Symptoms)  Outcome: Ongoing, Progressing  Flowsheets (Taken 3/1/2022 1534)  Mutually Determined Action Steps (Optimized Cognitive Function):   engages in cognitive-oriented therapies   engages in social cognitive training   Goal Outcome Evaluation:   Met with patient to complete Recreation Therapy Assessment.  Patient  is alert and disoriented x3.  He is pleasant and friendly, but unable to engage in meaningful conversation.  He does not respond appropriately to context of conversation and his speech is rambling.  Patient ambulates independently and paces around the unit.  Will continue to engage patient in cognitive activity and encourage appropriate responses.

## 2022-03-01 NOTE — PLAN OF CARE
Goal Outcome Evaluation:  Plan of Care Reviewed With: patient           Outcome Summary: PT eval completed on this date. Patient Independent with sit<>stand transfer and gait 50'x2 with no AD. Balance: 27/28 on Tinetti balance and gait assessment which indicates low fall risk. Moved to diaz balance scale to assess higher level balance activities. Patient score 36/56 on diaz balance scale which indicates increased fall risk. Patient may benefit from skilled PT to decrease fall risk and improve functional mobility if cognition allows.

## 2022-03-02 LAB
GLUCOSE BLDC GLUCOMTR-MCNC: 139 MG/DL (ref 70–130)
GLUCOSE BLDC GLUCOMTR-MCNC: 170 MG/DL (ref 70–130)
GLUCOSE BLDC GLUCOMTR-MCNC: 205 MG/DL (ref 70–130)
GLUCOSE BLDC GLUCOMTR-MCNC: 214 MG/DL (ref 70–130)

## 2022-03-02 PROCEDURE — 82962 GLUCOSE BLOOD TEST: CPT

## 2022-03-02 PROCEDURE — 99232 SBSQ HOSP IP/OBS MODERATE 35: CPT | Performed by: PSYCHIATRY & NEUROLOGY

## 2022-03-02 PROCEDURE — 63710000001 INSULIN ASPART PER 5 UNITS: Performed by: PSYCHIATRY & NEUROLOGY

## 2022-03-02 RX ADMIN — ASPIRIN 81 MG: 81 TABLET, CHEWABLE ORAL at 08:09

## 2022-03-02 RX ADMIN — SERTRALINE 50 MG: 50 TABLET, FILM COATED ORAL at 08:09

## 2022-03-02 RX ADMIN — MEMANTINE 10 MG: 5 TABLET ORAL at 20:15

## 2022-03-02 RX ADMIN — INSULIN ASPART 2 UNITS: 100 INJECTION, SOLUTION INTRAVENOUS; SUBCUTANEOUS at 11:44

## 2022-03-02 RX ADMIN — INSULIN ASPART 4 UNITS: 100 INJECTION, SOLUTION INTRAVENOUS; SUBCUTANEOUS at 16:51

## 2022-03-02 RX ADMIN — DONEPEZIL HYDROCHLORIDE 10 MG: 10 TABLET, FILM COATED ORAL at 08:09

## 2022-03-02 RX ADMIN — CLOPIDOGREL 75 MG: 75 TABLET, FILM COATED ORAL at 08:09

## 2022-03-02 RX ADMIN — GLIPIZIDE 5 MG: 5 TABLET ORAL at 16:53

## 2022-03-02 RX ADMIN — MEMANTINE 10 MG: 5 TABLET ORAL at 08:09

## 2022-03-02 RX ADMIN — MELATONIN 1.5 MG: 3 TAB ORAL at 20:15

## 2022-03-02 RX ADMIN — LISINOPRIL 10 MG: 10 TABLET ORAL at 08:08

## 2022-03-02 RX ADMIN — CANAGLIFLOZIN 300 MG: 300 TABLET, FILM COATED ORAL at 08:08

## 2022-03-02 RX ADMIN — RISPERIDONE 0.25 MG: 0.25 TABLET ORAL at 20:15

## 2022-03-02 RX ADMIN — ATORVASTATIN CALCIUM 40 MG: 40 TABLET, FILM COATED ORAL at 08:09

## 2022-03-02 RX ADMIN — METFORMIN HYDROCHLORIDE 1000 MG: 500 TABLET ORAL at 16:55

## 2022-03-02 RX ADMIN — GLIPIZIDE 5 MG: 5 TABLET ORAL at 08:08

## 2022-03-02 NOTE — NURSING NOTE
Behavior   Note any precipitants to event or behavior   Describe level and action of any aggressive behavior or speech and associated interventions.     Anxiety: Patient denies at this time  Depression: Patient denies at this time  Pain  0  AVH   no  S/I   no  Plan  no  H/I   no  Plan  no    Affect   blunted    Note: Patient presents with blunted affect and fair eye contact.  Patient continues to be oriented to self only.  Patient has been observed pacing hallway trying to open office doors and using a marker like a key trying to unlock doors.  Patient was med compliant and cooperative.  Patient denies anxiety, depression, SI, HI, and AVH.  Patient interacted appropriately with peers and staff.  Patient participated in unit activities.  Patient denies pain, discomfort, or any new medical problems.  Patient has been observed eating appropriately and having difficulties at night falling and staying asleep.  Patient voices no concerns at this time.  Will continue to monitor patient for safety.      Intervention    PRN medication utilized:  no    Instructed in medication usage and effects  Medications administered as ordered  Encouraged to verbalize needs      Response    Verbalized understanding   Did patient take medications as ordered yes   Did patient interact with assessment?  yes     Plan    Will monitor for safety  Will monitor every 15 minutes as ordered  Will evaluate and promote the plan of care    Last BM:  unknown date  (Please chart in I/O as well)

## 2022-03-02 NOTE — PLAN OF CARE
Goal Outcome Evaluation:  Plan of Care Reviewed With: patient  Patient Agreement with Plan of Care: agrees     Progress: improving  Outcome Summary: Patient continues to be oriented to self only.  Patient med compliant and cooperative.  Patient has been observed getting 4 hours of sleep so far this shift.

## 2022-03-02 NOTE — NURSING NOTE
"Pt seen in the common area sitting in the chair. Pt is able to somewhat have a meaningful conversation but does ramble nonsensically as well. Some word searching noted. 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 Pt reports he slept \"ok\" last night and is without any complaints this am. Pt does report being bored here. Meds taken whole this am with verbal cues on how to take them.   "

## 2022-03-02 NOTE — PLAN OF CARE
Goal Outcome Evaluation:  Plan of Care Reviewed With: patient  Patient Agreement with Plan of Care: unable to participate     Progress: improving  Outcome Summary: Diego has been without behaviors this shift.

## 2022-03-03 VITALS
BODY MASS INDEX: 26.82 KG/M2 | DIASTOLIC BLOOD PRESSURE: 57 MMHG | OXYGEN SATURATION: 96 % | TEMPERATURE: 96.8 F | SYSTOLIC BLOOD PRESSURE: 104 MMHG | HEART RATE: 72 BPM | HEIGHT: 67 IN | RESPIRATION RATE: 19 BRPM | WEIGHT: 170.86 LBS

## 2022-03-03 PROBLEM — G31.09 FRONTOTEMPORAL BRAIN DISEASE: Status: ACTIVE | Noted: 2022-03-03

## 2022-03-03 PROBLEM — F02.80 EARLY ONSET ALZHEIMER'S DEMENTIA: Status: ACTIVE | Noted: 2022-03-03

## 2022-03-03 PROBLEM — G30.0 EARLY ONSET ALZHEIMER'S DEMENTIA: Status: ACTIVE | Noted: 2022-03-03

## 2022-03-03 PROBLEM — F02.80 FRONTOTEMPORAL BRAIN DISEASE: Status: ACTIVE | Noted: 2022-03-03

## 2022-03-03 LAB
GLUCOSE BLDC GLUCOMTR-MCNC: 171 MG/DL (ref 70–130)
GLUCOSE BLDC GLUCOMTR-MCNC: 179 MG/DL (ref 70–130)

## 2022-03-03 PROCEDURE — 82962 GLUCOSE BLOOD TEST: CPT

## 2022-03-03 PROCEDURE — 63710000001 INSULIN ASPART PER 5 UNITS: Performed by: PSYCHIATRY & NEUROLOGY

## 2022-03-03 PROCEDURE — 99238 HOSP IP/OBS DSCHRG MGMT 30/<: CPT | Performed by: PSYCHIATRY & NEUROLOGY

## 2022-03-03 RX ORDER — RISPERIDONE 0.25 MG/1
0.25 TABLET ORAL NIGHTLY
Qty: 30 TABLET | Refills: 1 | Status: SHIPPED | OUTPATIENT
Start: 2022-03-03

## 2022-03-03 RX ORDER — CLOPIDOGREL BISULFATE 75 MG/1
TABLET ORAL
Qty: 90 TABLET | OUTPATIENT
Start: 2022-03-03

## 2022-03-03 RX ORDER — CLOPIDOGREL BISULFATE 75 MG/1
75 TABLET ORAL DAILY
Qty: 30 TABLET | Refills: 1 | Status: SHIPPED | OUTPATIENT
Start: 2022-03-04

## 2022-03-03 RX ORDER — UREA 10 %
1.5 LOTION (ML) TOPICAL NIGHTLY
Qty: 45 EACH | Refills: 1 | Status: SHIPPED | OUTPATIENT
Start: 2022-03-03

## 2022-03-03 RX ORDER — ATORVASTATIN CALCIUM 40 MG/1
TABLET, FILM COATED ORAL
Qty: 90 TABLET | OUTPATIENT
Start: 2022-03-03

## 2022-03-03 RX ORDER — ATORVASTATIN CALCIUM 40 MG/1
40 TABLET, FILM COATED ORAL DAILY
Qty: 30 TABLET | Refills: 1 | Status: SHIPPED | OUTPATIENT
Start: 2022-03-04

## 2022-03-03 RX ADMIN — METFORMIN HYDROCHLORIDE 1000 MG: 500 TABLET ORAL at 08:29

## 2022-03-03 RX ADMIN — CLOPIDOGREL 75 MG: 75 TABLET, FILM COATED ORAL at 08:29

## 2022-03-03 RX ADMIN — LISINOPRIL 10 MG: 10 TABLET ORAL at 08:30

## 2022-03-03 RX ADMIN — ATORVASTATIN CALCIUM 40 MG: 40 TABLET, FILM COATED ORAL at 08:30

## 2022-03-03 RX ADMIN — INSULIN ASPART 2 UNITS: 100 INJECTION, SOLUTION INTRAVENOUS; SUBCUTANEOUS at 06:55

## 2022-03-03 RX ADMIN — GLIPIZIDE 5 MG: 5 TABLET ORAL at 06:55

## 2022-03-03 RX ADMIN — ASPIRIN 81 MG: 81 TABLET, CHEWABLE ORAL at 08:29

## 2022-03-03 RX ADMIN — SERTRALINE 50 MG: 50 TABLET, FILM COATED ORAL at 08:30

## 2022-03-03 RX ADMIN — CANAGLIFLOZIN 300 MG: 300 TABLET, FILM COATED ORAL at 08:29

## 2022-03-03 RX ADMIN — INSULIN ASPART 2 UNITS: 100 INJECTION, SOLUTION INTRAVENOUS; SUBCUTANEOUS at 11:52

## 2022-03-03 RX ADMIN — MEMANTINE 10 MG: 5 TABLET ORAL at 08:30

## 2022-03-03 RX ADMIN — DONEPEZIL HYDROCHLORIDE 10 MG: 10 TABLET, FILM COATED ORAL at 08:30

## 2022-03-03 NOTE — PLAN OF CARE
Problem: Adult Behavioral Health Plan of Care  Goal: Plan of Care Review  Outcome: Ongoing, Progressing  Flowsheets  Taken 3/3/2022 0629 by Rika Funez, RN  Consent Given to Review Plan with:   Patient had difficulty going to sleep   total sleep is 7 hours. Patient awake in the common area, no overnight safety concerns.  Patient Agreement with Plan of Care: unable to participate (due to gross confusion).   Goal Outcome Evaluation:  Plan of Care Reviewed With: patient   Consent Given to Review Plan with: Patient had difficulty going to sleep; total sleep is 7 hours. Patient awake in the common area, no overnight safety concerns.

## 2022-03-03 NOTE — DISCHARGE INSTRUCTIONS
--Ensure that all mediations (including over the counter meds) are secured in a lock box and that you use a weekly pill planner.    --Ensure all weapons if present (including firearms) are secured (ideally in a gun safe or removed from home) and all ammo is secured and stored separately.    --Continue medications as currently prescribed.  --Continue follow-up with outpatient providers.  --Please contact our Behavioral Health Unit with any questions or concerns at [phone].  --Return to the ER with any suicidal or homicidal ideation, or worsening symptoms.    --The number for the National Suicide & Crisis Hotline is 1-535.724.5457.  The National Crisis Text number is 758-553.  These may be contacted at any time, if needed.

## 2022-03-03 NOTE — DISCHARGE SUMMARY
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 performed according to our departmental dose-optimization program, which includes automated exposure control, adjustment of the mA and/or kV according to patient size and/or use of iterative reconstruction technique. Findings: Minimal plaque right and left carotid bifurcations and proximal internal carotid arteries. No stenosis. Normal symmetrical vertebral arteries. Plaque of the origin of the right vertebral artery without significant stenosis. Normal origins of the great vessels. Normal basilar artery. Normal cerebellar arteries. Normal posterior cerebral arteries. Normal right and left middle cerebral arteries. Small atretic A1 segment left anterior cerebral artery (normal variant). Anterior cerebral arteries are otherwise unremarkable.     Impression: Minimal plaque both carotid bifurcations and proximal internal carotid arteries without significant stenosis. Normal vertebral arteries. Normal origins of the great vessels. Normal CT angiography intracranial circulation. No intracranial vascular anomalies are appreciated. Electronically signed by:  Jame Head MD  2/28/2022 3:53 PM CST Workstation: [id number]        Summary of Hospital Course:     Mr. Diego Brizuela was admitted to the behavioral health unit at [facility] to ensure patient safety; provided treatment with the unit milieu, activities, therapies and  psychopharmacological management.      Diego Brizuela was placed on Q15 minute checks and Elopement and Fall.     Hospitalist was consulted for management of medical co-morbidities.      Patient was restarted on the following psychiatric medications:   --namenda 10mg bid  --aricept 10mg daily  --zoloft 25mg daily    The following medication changes were made during the hospital stay:   --Cont Risperdal 0.25mg for bx, cognition  --Cont Aricept 10mg daily for NCD  --Cont Namenda 10mg BID for NCD  --Cont Zoloft  50mg daily for affective concerns from NCD/FTD (first dosing today)  --Cont MLT 1.5mg qHS for circadian entrainment    Mr. Diego Brizuela had improvement over the course of the hospital stay and tolerated medications well.  VH and behaviors resolved and these gains were maintained during stay.  No behavioral issues.       Active family involvement with pt's wife.      Substance abuse issues were not present.        Denies firearm access; confirmed by wife.    At time of interview, Mr. Diego Brizuela adamantly denies any thoughts of death or dying.  Adamantly and convincingly denies any nihilism, suicidal ideations, intentions or plans/planning.  Denies any homicidal ideations, intentions or plans/planning.  Denies any auditory or visual hallucinations.  Denies paranoia; none overt.  Not grossly psychotic.  Mr. Diego Brizuela does not constitute an imminent risk of harm to self or others, at time of the interview.  Therefore, does not meet commitment criteria at this time.      Risk Assessment & Patient's Condition at Discharge --  Currently, Mr. Diego Brizuela is not suicidal, feels fairly hopeful about the future, and remains pleasantly confused.    However, given history of impulsiveness, chronic illness, it is probable that he will act impulsively at some point in life when stressed, including possibility of self harm or harm to others.  Unfortunately, this is a function of  "future acute stressors -- stressors over which I have no current control and not how he feels right now.    Currently, he is stable for discharge and is not an imminent threat to self or others.             Discharging Exam:    Targeted PE:   --MS:  No tremors or abnormal involuntary movements and No Cog [place] or Rigidity and No atrophy noted  --Gait & Station:  Blank multiple: Normal  --HEENT: No Nystagmus or Opthalmoplegia.     --Pulm: unlaboured    MSE:  --Cooperation:  Cooperative  --Eye Contact:  Fair  --Psychomotor Behavior:  Appropriate  --Mood:  \"OK\"  --Affect:  pleasantly confused  --Speech:  Rambling and Soft  --Thought Process:  Dyscognitive  --Associations: Disorganized  --Themes:  None overt  --Thought Content:     --Mood congruent   --Suicidal:  Denies    --Homicidal:  Denies   --Hallucinations:  Not appearing to respond to internal stimuli at time of my interview   --Delusion:  None noted/overt  --Cognitive Functioning:   -Consciousness: awake and alert   -Orientation:  Person   -Attention:  Distractible    -Concentration:  Distractible   -Fund of Knowledge:  Below Average based on interaction; premorbid fund likely above avg  --Reliability:  limited by NCD  --Insight:  Limited by NCD  --Judgment:  Improving  --Impulse Control:  Improving      AIMS: 0        Discharge Medications:      Your medication list      START taking these medications      Instructions Last Dose Given Next Dose Due   cholecalciferol 1.25 MG (82336 UT) capsule  Commonly known as: VITAMIN D3  Start taking on: March 6, 2022      Take 1 capsule by mouth Every 7 (Seven) Days. Indications: Vitamin D Deficiency       clopidogrel 75 MG tablet  Commonly known as: PLAVIX  Start taking on: March 4, 2022      Take 1 tablet by mouth Daily. Indications: Cerebrovascular Accident or Stroke       melatonin 1 MG tablet      Take 1.5 tablets by mouth Every Night. Indications: Circadian entrainment       risperiDONE 0.25 MG tablet  Commonly " known as: risperDAL      Take 1 tablet by mouth Every Night. Indications: Behavioral Disorders associated with Dementia, Visual hallucination related to cognitive disorder          CHANGE how you take these medications      Instructions Last Dose Given Next Dose Due   atorvastatin 40 MG tablet  Commonly known as: LIPITOR  Start taking on: March 4, 2022  What changed:   · medication strength  · how much to take      Take 1 tablet by mouth Daily. Indications: Cerebrovascular Accident or Stroke, High Amount of Fats in the Blood       sertraline 50 MG tablet  Commonly known as: ZOLOFT  Start taking on: March 4, 2022  What changed:   · medication strength  · how much to take      Take 1 tablet by mouth Daily. Indications: Mood & Anxiety related to neurocognitive disorder          CONTINUE taking these medications      Instructions Last Dose Given Next Dose Due   aspirin 81 MG chewable tablet      Chew 81 mg Daily.       donepezil 10 MG tablet  Commonly known as: ARICEPT      Take 10 mg by mouth Daily.       glipizide 5 MG tablet  Commonly known as: GLUCOTROL      Take 5 mg by mouth 2 (Two) Times a Day Before Meals.       Invokana 300 MG tablet tablet  Generic drug: Canagliflozin      Take 300 mg by mouth Daily.       lisinopril 10 MG tablet  Commonly known as: PRINIVIL,ZESTRIL      Take 10 mg by mouth Daily.       memantine 10 MG tablet  Commonly known as: NAMENDA      Take 10 mg by mouth 2 (Two) Times a Day.       metFORMIN 1000 MG tablet  Commonly known as: GLUCOPHAGE      Take 1,000 mg by mouth 2 (Two) Times a Day With Meals.          STOP taking these medications    cetirizine 10 MG tablet  Commonly known as: zyrTEC        insulin glargine 100 UNIT/ML injection  Commonly known as: LANMONA DELGADOGLEE        LORazepam 2 MG tablet  Commonly known as: ATIVAN              Where to Get Your Medications      These medications were sent to [id number] - [place] [address] [address]  [place] [facility] [place] [address] [place]  [phone] [facility] [phone] FX  [address] [facility] [address]    Phone: [phone]   · atorvastatin 40 MG tablet  · cholecalciferol 1.25 MG (53371 UT) capsule  · clopidogrel 75 MG tablet  · melatonin 1 MG tablet  · risperiDONE 0.25 MG tablet  · sertraline 50 MG tablet         Justification for multiple antipsychotic medications at discharge:    --Not Applicable.  Medication for smoking cessation:   --Patient does not smoke and medication is not indicated.  Medication for substance abuse:   --Patient does not have a substance use diagnosis and medication is not indicated.    Discharge Diet:   As per pre-admission.  Activity Level:   As per pre-admission.    Disposition: Patient was discharged home with family.    Outpatient Follow-Up:   Follow-up Information     [facility] Follow up.    Why: Referral was sent 03/01/2022. [facility] reports that they will contact patient within 7-14 business day with an appointment.   Contact information:  [address], [address]  [place] [address]  Phone: [phone]  Fax: [phone]           [facility] Follow up.    Why: Home Health will follow up with patient 8 hours a week after patient discharges.   Contact information:  [facility]  [place] [address]  Phone: [phone]  Fax: [phone]           Margaux Fisher APRN .    Specialty: Certified Clinical Nurse Specialist  Contact information:  [address]  [address] [id number] [phone]                         --Follow-up as above.       Time Spent: Less than 30 minutes.  I spent 25 minutes on this discharge activity which included: face to face encounter with the patient, reviewing the data in the system, coordination of the care with the nursing staff as well as consultants, documentation, and entering orders.     Yoan Floyd II, MD  03/03/22  13:07 CST

## 2022-03-03 NOTE — NURSING NOTE
Behavior   Note any precipitants to event or behavior   Describe level and action of any aggressive behavior or speech and associated interventions.     Anxiety: Excess Worry, Restless/Edgy, Decreased sleep and Decreased concentration  Depression: Patient denies at this time  Pain  0  AVH   no  S/I   no  Plan  no  H/I   no  Plan  no    Affect   constricted      Note: Pt remains disorganized, grossly confused, oriented to person only. Pt is calm, pleasant, and cooperative with interactions, accepting of all medications. Pt requires frequent help finding his room and prompting to eat meals and drink fluids due to confusion, monitoring intake and output. Pt denies SI/HI/AVH. Will continue to provide support and safety, observe mood and behavior, and maintain Q 15 minute safety observations as ordered.       Intervention    PRN medication utilized:  no    Instructed in medication usage and effects  Medications administered as ordered  Encouraged to verbalize needs      Response    Verbalized understanding   Did patient take medications as ordered yes   Did patient interact with assessment?  yes     Plan    Will monitor for safety  Will monitor every 15 minutes as ordered  Will evaluate and promote the plan of care    Last BM:  unknown date  (Please chart in I/O as well)
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
## 2022-03-03 NOTE — NURSING NOTE
Pt discharged home with spouse via private vehicle. Pt is stable without s/s of distress. Personal belongings returned. Discharge paperwork sent with the pt.
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
## 2022-03-07 ENCOUNTER — APPOINTMENT (OUTPATIENT)
Dept: CT IMAGING | Facility: HOSPITAL | Age: 67
End: 2022-03-07

## 2022-03-07 ENCOUNTER — HOSPITAL ENCOUNTER (INPATIENT)
Facility: HOSPITAL | Age: 67
LOS: 3 days | Discharge: PSYCHIATRIC OR SUBSTANCE USE FACILITY | End: 2022-03-10
Attending: INTERNAL MEDICINE | Admitting: INTERNAL MEDICINE

## 2022-03-07 ENCOUNTER — APPOINTMENT (OUTPATIENT)
Dept: CARDIOLOGY | Facility: HOSPITAL | Age: 67
End: 2022-03-07

## 2022-03-07 DIAGNOSIS — R13.10 DYSPHAGIA, UNSPECIFIED TYPE: Primary | ICD-10-CM

## 2022-03-07 DIAGNOSIS — Z74.09 IMPAIRED MOBILITY: ICD-10-CM

## 2022-03-07 PROBLEM — S06.2XAA BRAIN CONTUSION: Status: ACTIVE | Noted: 2022-03-07

## 2022-03-07 PROBLEM — I63.9 CVA (CEREBRAL VASCULAR ACCIDENT): Status: ACTIVE | Noted: 2022-03-07

## 2022-03-07 LAB
BH CV ECHO MEAS - AO MAX PG (FULL): 5.1 MMHG
BH CV ECHO MEAS - AO MAX PG: 9 MMHG
BH CV ECHO MEAS - AO MEAN PG (FULL): 2 MMHG
BH CV ECHO MEAS - AO MEAN PG: 5 MMHG
BH CV ECHO MEAS - AO ROOT AREA (BSA CORRECTED): 1.9
BH CV ECHO MEAS - AO ROOT AREA: 10.2 CM^2
BH CV ECHO MEAS - AO ROOT DIAM: 3.6 CM
BH CV ECHO MEAS - AO V2 MAX: 150 CM/SEC
BH CV ECHO MEAS - AO V2 MEAN: 109 CM/SEC
BH CV ECHO MEAS - AO V2 VTI: 32 CM
BH CV ECHO MEAS - AVA(I,A): 3 CM^2
BH CV ECHO MEAS - AVA(I,D): 3 CM^2
BH CV ECHO MEAS - AVA(V,A): 2.5 CM^2
BH CV ECHO MEAS - AVA(V,D): 2.5 CM^2
BH CV ECHO MEAS - BSA(HAYCOCK): 1.9 M^2
BH CV ECHO MEAS - BSA: 1.9 M^2
BH CV ECHO MEAS - BZI_BMI: 26.6 KILOGRAMS/M^2
BH CV ECHO MEAS - BZI_METRIC_HEIGHT: 170.2 CM
BH CV ECHO MEAS - BZI_METRIC_WEIGHT: 77.1 KG
BH CV ECHO MEAS - EDV(CUBED): 65.5 ML
BH CV ECHO MEAS - EDV(MOD-SP4): 70.2 ML
BH CV ECHO MEAS - EDV(TEICH): 71.3 ML
BH CV ECHO MEAS - EF(CUBED): 75 %
BH CV ECHO MEAS - EF(MOD-SP4): 63.5 %
BH CV ECHO MEAS - EF(TEICH): 67.4 %
BH CV ECHO MEAS - ESV(CUBED): 16.4 ML
BH CV ECHO MEAS - ESV(MOD-SP4): 25.6 ML
BH CV ECHO MEAS - ESV(TEICH): 23.2 ML
BH CV ECHO MEAS - FS: 37 %
BH CV ECHO MEAS - IVS/LVPW: 0.91
BH CV ECHO MEAS - IVSD: 1.2 CM
BH CV ECHO MEAS - LA DIMENSION: 3.1 CM
BH CV ECHO MEAS - LA/AO: 0.86
BH CV ECHO MEAS - LAT PEAK E' VEL: 6.3 CM/SEC
BH CV ECHO MEAS - LV DIASTOLIC VOL/BSA (35-75): 37.2 ML/M^2
BH CV ECHO MEAS - LV MASS(C)D: 188.6 GRAMS
BH CV ECHO MEAS - LV MASS(C)DI: 99.9 GRAMS/M^2
BH CV ECHO MEAS - LV MAX PG: 3.9 MMHG
BH CV ECHO MEAS - LV MEAN PG: 3 MMHG
BH CV ECHO MEAS - LV SYSTOLIC VOL/BSA (12-30): 13.6 ML/M^2
BH CV ECHO MEAS - LV V1 MAX: 99 CM/SEC
BH CV ECHO MEAS - LV V1 MEAN: 81.9 CM/SEC
BH CV ECHO MEAS - LV V1 VTI: 25.3 CM
BH CV ECHO MEAS - LVIDD: 4 CM
BH CV ECHO MEAS - LVIDS: 2.5 CM
BH CV ECHO MEAS - LVLD AP4: 8.1 CM
BH CV ECHO MEAS - LVLS AP4: 6.5 CM
BH CV ECHO MEAS - LVOT AREA (M): 3.8 CM^2
BH CV ECHO MEAS - LVOT AREA: 3.8 CM^2
BH CV ECHO MEAS - LVOT DIAM: 2.2 CM
BH CV ECHO MEAS - LVPWD: 1.4 CM
BH CV ECHO MEAS - MED PEAK E' VEL: 5.9 CM/SEC
BH CV ECHO MEAS - MV A MAX VEL: 99.1 CM/SEC
BH CV ECHO MEAS - MV DEC SLOPE: 440 CM/SEC^2
BH CV ECHO MEAS - MV DEC TIME: 0.15 SEC
BH CV ECHO MEAS - MV E MAX VEL: 66.6 CM/SEC
BH CV ECHO MEAS - MV E/A: 0.67
BH CV ECHO MEAS - RAP SYSTOLE: 10 MMHG
BH CV ECHO MEAS - RVSP: 31.9 MMHG
BH CV ECHO MEAS - SI(AO): 172.6 ML/M^2
BH CV ECHO MEAS - SI(CUBED): 26 ML/M^2
BH CV ECHO MEAS - SI(LVOT): 51 ML/M^2
BH CV ECHO MEAS - SI(MOD-SP4): 23.6 ML/M^2
BH CV ECHO MEAS - SI(TEICH): 25.5 ML/M^2
BH CV ECHO MEAS - SV(AO): 325.7 ML
BH CV ECHO MEAS - SV(CUBED): 49.1 ML
BH CV ECHO MEAS - SV(LVOT): 96.2 ML
BH CV ECHO MEAS - SV(MOD-SP4): 44.6 ML
BH CV ECHO MEAS - SV(TEICH): 48 ML
BH CV ECHO MEAS - TR MAX VEL: 234 CM/SEC
BH CV ECHO MEASUREMENTS AVERAGE E/E' RATIO: 10.92
GLUCOSE BLDC GLUCOMTR-MCNC: 104 MG/DL (ref 70–130)
GLUCOSE BLDC GLUCOMTR-MCNC: 154 MG/DL (ref 70–130)
GLUCOSE BLDC GLUCOMTR-MCNC: 154 MG/DL (ref 70–130)
GLUCOSE BLDC GLUCOMTR-MCNC: 44 MG/DL (ref 70–130)
GLUCOSE BLDC GLUCOMTR-MCNC: 46 MG/DL (ref 70–130)
GLUCOSE BLDC GLUCOMTR-MCNC: 49 MG/DL (ref 70–130)
GLUCOSE BLDC GLUCOMTR-MCNC: 54 MG/DL (ref 70–130)
GLUCOSE BLDC GLUCOMTR-MCNC: 67 MG/DL (ref 70–130)
GLUCOSE BLDC GLUCOMTR-MCNC: 96 MG/DL (ref 70–130)
GLUCOSE BLDC GLUCOMTR-MCNC: 99 MG/DL (ref 70–130)
LEFT ATRIUM VOLUME INDEX: 4.7 ML/M2
LEFT ATRIUM VOLUME: 27.8 CM3
MAXIMAL PREDICTED HEART RATE: 156 BPM
STRESS TARGET HR: 133 BPM

## 2022-03-07 PROCEDURE — 99221 1ST HOSP IP/OBS SF/LOW 40: CPT | Performed by: NURSE PRACTITIONER

## 2022-03-07 PROCEDURE — 93306 TTE W/DOPPLER COMPLETE: CPT | Performed by: INTERNAL MEDICINE

## 2022-03-07 PROCEDURE — 70450 CT HEAD/BRAIN W/O DYE: CPT

## 2022-03-07 PROCEDURE — 82962 GLUCOSE BLOOD TEST: CPT

## 2022-03-07 PROCEDURE — 99223 1ST HOSP IP/OBS HIGH 75: CPT | Performed by: PSYCHIATRY & NEUROLOGY

## 2022-03-07 PROCEDURE — 93306 TTE W/DOPPLER COMPLETE: CPT

## 2022-03-07 PROCEDURE — 92610 EVALUATE SWALLOWING FUNCTION: CPT

## 2022-03-07 RX ORDER — DONEPEZIL HYDROCHLORIDE 10 MG/1
10 TABLET, FILM COATED ORAL DAILY
Status: DISCONTINUED | OUTPATIENT
Start: 2022-03-08 | End: 2022-03-10 | Stop reason: HOSPADM

## 2022-03-07 RX ORDER — SODIUM CHLORIDE 0.9 % (FLUSH) 0.9 %
10 SYRINGE (ML) INJECTION AS NEEDED
Status: DISCONTINUED | OUTPATIENT
Start: 2022-03-07 | End: 2022-03-10 | Stop reason: HOSPADM

## 2022-03-07 RX ORDER — RISPERIDONE 0.25 MG/1
0.25 TABLET ORAL NIGHTLY
Status: DISCONTINUED | OUTPATIENT
Start: 2022-03-07 | End: 2022-03-08

## 2022-03-07 RX ORDER — DEXTROSE MONOHYDRATE 25 G/50ML
INJECTION, SOLUTION INTRAVENOUS
Status: COMPLETED
Start: 2022-03-07 | End: 2022-03-07

## 2022-03-07 RX ORDER — ARIPIPRAZOLE 2 MG/1
2 TABLET ORAL DAILY
COMMUNITY
End: 2022-03-10 | Stop reason: HOSPADM

## 2022-03-07 RX ORDER — MELATONIN
5000 DAILY
Status: DISCONTINUED | OUTPATIENT
Start: 2022-03-07 | End: 2022-03-10 | Stop reason: HOSPADM

## 2022-03-07 RX ORDER — DEXTROSE AND SODIUM CHLORIDE 5; .9 G/100ML; G/100ML
75 INJECTION, SOLUTION INTRAVENOUS CONTINUOUS
Status: DISCONTINUED | OUTPATIENT
Start: 2022-03-07 | End: 2022-03-08

## 2022-03-07 RX ORDER — MEMANTINE HYDROCHLORIDE 5 MG/1
10 TABLET ORAL EVERY 12 HOURS SCHEDULED
Status: DISCONTINUED | OUTPATIENT
Start: 2022-03-07 | End: 2022-03-10 | Stop reason: HOSPADM

## 2022-03-07 RX ORDER — DEXTROSE MONOHYDRATE 25 G/50ML
25 INJECTION, SOLUTION INTRAVENOUS
Status: DISCONTINUED | OUTPATIENT
Start: 2022-03-07 | End: 2022-03-10 | Stop reason: HOSPADM

## 2022-03-07 RX ORDER — SODIUM CHLORIDE 9 MG/ML
75 INJECTION, SOLUTION INTRAVENOUS CONTINUOUS
Status: DISCONTINUED | OUTPATIENT
Start: 2022-03-07 | End: 2022-03-07

## 2022-03-07 RX ORDER — NICOTINE POLACRILEX 4 MG
15 LOZENGE BUCCAL
Status: DISCONTINUED | OUTPATIENT
Start: 2022-03-07 | End: 2022-03-10 | Stop reason: HOSPADM

## 2022-03-07 RX ORDER — LORAZEPAM 2 MG/1
2 TABLET ORAL DAILY PRN
Status: ON HOLD | COMMUNITY
End: 2022-03-08

## 2022-03-07 RX ORDER — ATORVASTATIN CALCIUM 40 MG/1
40 TABLET, FILM COATED ORAL NIGHTLY
Status: DISCONTINUED | OUTPATIENT
Start: 2022-03-07 | End: 2022-03-10 | Stop reason: HOSPADM

## 2022-03-07 RX ORDER — VENLAFAXINE HYDROCHLORIDE 150 MG/1
150 CAPSULE, EXTENDED RELEASE ORAL DAILY
Status: ON HOLD | COMMUNITY
End: 2022-03-08

## 2022-03-07 RX ORDER — SODIUM CHLORIDE 0.9 % (FLUSH) 0.9 %
10 SYRINGE (ML) INJECTION EVERY 12 HOURS SCHEDULED
Status: DISCONTINUED | OUTPATIENT
Start: 2022-03-07 | End: 2022-03-10 | Stop reason: HOSPADM

## 2022-03-07 RX ADMIN — Medication 5000 UNITS: at 15:57

## 2022-03-07 RX ADMIN — DEXTROSE MONOHYDRATE 25 G: 500 INJECTION PARENTERAL at 11:56

## 2022-03-07 RX ADMIN — Medication 10 ML: at 20:21

## 2022-03-07 RX ADMIN — DEXTROSE AND SODIUM CHLORIDE 75 ML/HR: 5; 900 INJECTION, SOLUTION INTRAVENOUS at 15:35

## 2022-03-07 RX ADMIN — DEXTROSE MONOHYDRATE 25 G: 500 INJECTION PARENTERAL at 15:14

## 2022-03-07 RX ADMIN — DEXTROSE MONOHYDRATE 50 ML: 500 INJECTION PARENTERAL at 09:21

## 2022-03-07 RX ADMIN — DEXTROSE MONOHYDRATE 25 G: 500 INJECTION PARENTERAL at 23:54

## 2022-03-07 RX ADMIN — RISPERIDONE 0.25 MG: 0.25 TABLET, FILM COATED ORAL at 20:20

## 2022-03-07 RX ADMIN — ATORVASTATIN CALCIUM 40 MG: 40 TABLET, FILM COATED ORAL at 20:20

## 2022-03-07 RX ADMIN — MEMANTINE HYDROCHLORIDE 10 MG: 5 TABLET, FILM COATED ORAL at 20:20

## 2022-03-07 RX ADMIN — Medication 10 ML: at 11:57

## 2022-03-07 RX ADMIN — SODIUM CHLORIDE 75 ML/HR: 9 INJECTION, SOLUTION INTRAVENOUS at 09:56
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
## 2022-03-07 NOTE — CONSULTS
Neurology Consult Note    Patient:  Diego Brizuela   YOB: 1955  MRN:  [id number]  Date of Admission:  3/7/2022  8:58 AM    Date: 3/7/2022    Referring Provider: Fernando Anthony DO  Reason for Consultation: Stroke      History of present illness:     This is a 66 y.o.  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  with known diagnosis of hypertension, hyperlipidemia, DM2, sleep disorder, and dementia with behavior issues and hallucinations evaluated for stroke  Labs obtained on 3/1/22:  NH3 of 20  RPR is NR  HIV 1 and 2 are NR  On 2/26 his TSH was normal at 1.7 and B12 was high at 1,119  Vitamin D 25 OH was deficient at 12.7  Patient was found by his wife last night unresponsive in the bathroom and with laceration that required sutures over his right eye. Head CT revealed a lesion suggestive of a hemorrhagic component (verses artifact) over area of contusion on left.   LDL of 101 on 2/26/22 Goal of < 70  Hemoglobin A1C of 7.5 on 2/27/22 with goal of < 7.0    Patient is awake and alert and talking but not answering the questions asked and to following commands well although he may do partial following of a command..     Patient had recently been hospitalized at [facility] and MRI of brain with and without showed by report  there revealed small acute ischemic focus of the bilateral corona radiata:and old right cerebellar stroke and old right occipital, left caudate      Current one done at [facility] is likely artifact. But shows hypodensity of left   Past medical history   Hypertension  Hyperlipidemia  Alzheimer's  Sleep Disorder  DM2    Past Surgical History:   Procedure Laterality Date   • URETERAL STENT INSERTION         Prior to Admission medications    Medication Sig Start Date End Date Taking? Authorizing Provider   aspirin 81 MG chewable tablet Chew 81 mg Daily.    Provider, MD Jacob   atorvastatin (LIPITOR) 40 MG tablet Take 1 tablet by mouth Daily.  Indications: Cerebrovascular Accident or Stroke, High Amount of Fats in the Blood 3/4/22   Yoan Floyd II, MD   Canagliflozin (Invokana) 300 MG tablet tablet Take 300 mg by mouth Daily.    Jacob Rodas MD   cholecalciferol (VITAMIN D3) 1.25 MG (23237 UT) capsule Take 1 capsule by mouth Every 7 (Seven) Days. Indications: Vitamin D Deficiency 3/6/22   Yoan Floyd II, MD   clopidogrel (PLAVIX) 75 MG tablet Take 1 tablet by mouth Daily. Indications: Cerebrovascular Accident or Stroke 3/4/22   Yoan Floyd II, MD   donepezil (ARICEPT) 10 MG tablet Take 10 mg by mouth Daily.    Jacob Rodas MD   glipizide (GLUCOTROL) 5 MG tablet Take 5 mg by mouth 2 (Two) Times a Day Before Meals.    Jacob Rodas MD   lisinopril (PRINIVIL,ZESTRIL) 10 MG tablet Take 10 mg by mouth Daily.    Jacob Rodas MD   melatonin 1 MG tablet Take 1.5 tablets by mouth Every Night. Indications: Circadian entrainment 3/3/22   Yoan Floyd II, MD   memantine (NAMENDA) 10 MG tablet Take 10 mg by mouth 2 (Two) Times a Day.    Jacob Rodas MD   metFORMIN (GLUCOPHAGE) 1000 MG tablet Take 1,000 mg by mouth 2 (Two) Times a Day With Meals.    Jacob Rodas MD   risperiDONE (risperDAL) 0.25 MG tablet Take 1 tablet by mouth Every Night. Indications: Behavioral Disorders associated with Dementia, Visual hallucination related to cognitive disorder 3/3/22   Yoan Floyd II, MD   sertraline (ZOLOFT) 50 MG tablet Take 1 tablet by mouth Daily. Indications: Mood & Anxiety related to neurocognitive disorder 3/4/22   Yoan Floyd II, MD       Hospital scheduled medications:   atorvastatin, 40 mg, Oral, Nightly  [START ON 3/8/2022] donepezil, 10 mg, Oral, Daily  insulin regular, 2-7 Units, Subcutaneous, Q6H  memantine, 10 mg, Oral, Q12H  risperiDONE, 0.25 mg, Oral, Nightly  [START ON 3/8/2022] sertraline, 50 mg, Oral, Daily  sodium chloride, 10 mL, Intravenous,  Q12H      Hospital PRN medications:  dextrose  •  dextrose  •  glucagon (human recombinant)  •  sodium chloride    No Known Allergies    Social History     Socioeconomic History   • Marital status:    Tobacco Use   • Smoking status: Current Every Day Smoker     Packs/day: 1.00     Types: Cigarettes   Vaping Use   • Vaping Use: Unknown   Substance and Sexual Activity   • Alcohol use: Not Currently   • Drug use: Not Currently   • Sexual activity: Defer     History reviewed. No pertinent family history.    Review of Systems  A 14 point review of systems was unobtainable due to his responses that do not answer the question    Vital Signs   Temp:  [96.5 °F (35.8 °C)-96.8 °F (36 °C)] 96.8 °F (36 °C)  Heart Rate:  [] 104  Resp:  [16-21] 21  BP: (117-170)/() 117/62    General Exam:  Head:  Normal cephalic, traumatic-area of contusion over right forehead/eye  HEENT:  Neck supple  Fundoscopic Exam:  No signs of disc edema  CVS:  Regular rate and rhythm.  No murmurs  Carotid Examination:  No bruits  Lungs:  Clear to auscultation  Abdomen:  Non-tender, Non-distended  Extremities:  No signs of peripheral edema  Skin:  No rashes    Neurologic Exam:    Mental Status:    -Awake, Alert, Not answering questions and talking but difficult to understand at time. -  -Partially will follow some simple  commands    CN II:  Visual fields full to threat.  Pupils equally reactive to light  CN III, IV, VI:  Extraocular Muscles full with no signs of nystagmus  CN V:  Facial sensory--unreliable to test  CN VII:  Facial motor symmetric but ? Subtle decreased movement on the right  CN VIII:  Gross hearing --may be impaired  CN IX/X:  Palate elevates symmetrically  CN XI:  Shoulder shrug symmetric  CN XII:  Tongue is midline on protrusion    Motor: (strength out of 5:  1= minimal movement, 2 = movement in plane of gravity, 3 = movement against gravity, 4 = movement against some resistance, 5 = full strength)    He moves all 4  extremities but will not move the right arm and right leg as readily as the left arm and leg. No clear evidence of weakness.    DTR:  -Right   Bicep: 2+ Triceps: 2+ Brachioradialis: 2+   Patella: 2+ Ankle: 2+ Neg Babinski  -Left   Bicep: 2+ Triceps: 2+ Brachioradialis: 2+   Patella: 2+ Ankle: 2+ Neg Babinski    Sensory:  -Unable to assess light touch, pinprick, temperature, pain, and proprioception    Coordination:  -Finger to nose/Heel to shin--unable to get him to follow these commands-No ataxia    Gait  -Not able to test      Results Review:  Lab Results (last 7 days)     Procedure Component Value Units Date/Time    POC Glucose Once [[id number]]  (Abnormal) Collected: 03/07/22 0915    Specimen: Blood Updated: 03/07/22 0926     Glucose 44 mg/dL      Comment: : [id number] [email or web address] ID: [id number]       POC Glucose Once [[id number]]  (Abnormal) Collected: 03/07/22 0913    Specimen: Blood Updated: 03/07/22 0926     Glucose 54 mg/dL      Comment: : [id number] AutoShag NancyMeter ID: [id number]             .  Imaging Results (Last 24 Hours)     Procedure Component Value Units Date/Time    CT Head Without Contrast [[id number]] Resulted: 03/07/22 1120     Updated: 03/07/22 1121                    Impression  1. Recent unwitnessed fall   Head CT from outside hospital suggests small hemorrhage around area of contusion on the left  but this may well be artifact  2. Subacute stroke on background of previous strokes and cerebral microvascular disease  3. Dementia reportedly of the Alzheimer's type  4. Superimposed metabolic encephalopathy  5. Hypoglycemia down to 44--multiple episodes of hypoglycemia   6. Hypotension down to sBP of  97   7. Hypertension up to 170  8. Vitamin D deficiency  9. Patient is DNR/DNI      Plan    Agree with repeat Head CT but may need MRI  Consider EEG but will try and reach family to get a better history of what has taken place.   Replace vitamin D with cholecalciferol  Echo  pending  Will try and discuss the situation with the family and get an idea of how aggressive they want us to be.     I discussed the patients findings and my recommendations with patient, nursing staff and Dr Lakhani and Dr Raj Ordoñez MD  03/07/22  12:20 CST

## 2022-03-07 NOTE — PLAN OF CARE
Goal Outcome Evaluation:  Plan of Care Reviewed With: patient, spouse        Progress: no change   Mr Brizuela remains confused r/t events, date location.  He needs frequent orientation and reassurance.  His blood glucose has been low, D50 given 3 times this shift.  D5NS now infusing. [place] has been removed, the urine was blood tinged. He remains in restraints.  Speech passed him for a pureed diet.

## 2022-03-07 NOTE — CONSULTS
NEUROSURGERY INITIAL HOSPITAL ENCOUNTER    Assessment/Plan:   Diego Brizuela is a 66 y.o. male with a significant medical history of dementia with Alzheimer's, hypertension, hyperlipidemia, type 2 diabetes, and tobacco abuse.  He presents with a new problem of altered mental status. Physical exam findings of arousable to verbal stimuli, oriented x1 to person only, follows simple commands with minimal prompting showing thumbs up bilaterally, moves all extremities equal and symmetric. Prior imaging from outside facility shown concern for a hemorrhagic contusion involving the gyrus of the left frontal lobe.  Repeat imaging shows no evidence of acute intracranial blood products, mass, mass-effect, midline shift, or ventriculomegaly.    Differential Diagnosis:   Change in mental status  Early onset Alzheimer's   Dementia with behavioral problem  Fall from standing height    Recommendations:  No evidence of acute intracranial abnormalities noted on repeat CT head.  No neurosurgical intervention warranted at this time.  Neurosurgery will continue to follow as needed.    Consult at the request of Dr. Dinero.    I discussed the patients findings and my recommendations with patient and consulting provider    Thank you very much for this interesting consult.     Level of Risk: High due to:  abrupt change in neurological status  MDM: High  Mod = 16412, Gxwi=[id number]  ___________________________________________________________________    Reason for consult: Altered mental status    Chief Complaint: Altered mental status    HPI: Diego Brizuela is a 66 y.o. male with a significant medical history of dementia with Alzheimer's, hypertension, hyperlipidemia, and type 2 diabetes.  He presents with a new problem of altered mental status.  To note, Mr. Brizuela was recently discharged on 3/3/2022 from [facility] behavioral unit.    In review of chart documentation from [facility], Mr. Brizuela fell at his home, where he  resides with his spouse at approximately 2200 on 3/6/2022.  He apparently became uncooperative and was wandering throughout their house.  Upon EMS arrival he was found unresponsive in his bathroom.  He was transported to [facility] ED and was found to have a questionable hemorrhagic contusion involving the gyrus of the left frontal lobe.  He has since been transferred to [facility] ED for further evaluation and care.    Review of Systems   Unable to perform ROS: Dementia      Past Medical History:  has a past medical history of Alzheimer's dementia (HCC), Diabetes mellitus (HCC), Elevated cholesterol, Hypertension, and Nephrolithiasis.    Past Surgical History:  has a past surgical history that includes Ureteral stent placement.    Family History: family history is not on file.    Social History:  reports that he has been smoking cigarettes. He has been smoking about 1.00 pack per day. He does not have any smokeless tobacco history on file. He reports previous alcohol use. He reports previous drug use.    Allergies: Patient has no known allergies.    Home Medications:   Current Facility-Administered Medications:   •  atorvastatin (LIPITOR) tablet 40 mg, 40 mg, Oral, Nightly, Fernando Anthony,   •  cholecalciferol (VITAMIN D3) tablet 5,000 Units, 5,000 Units, Oral, Daily, Jyoti Sotelo MD  •  dextrose (D50W) (25 g/50 mL) IV injection 25 g, 25 g, Intravenous, Q15 Min PRN, Fernando Anthony, , 25 g at 03/07/22 1156  •  dextrose (GLUTOSE) oral gel 15 g, 15 g, Oral, Q15 Min PRN, Fernando Anthony,   •  [START ON 3/8/2022] donepezil (ARICEPT) tablet 10 mg, 10 mg, Oral, Daily, Fernando Anthony, DO  •  glucagon (human recombinant) (GLUCAGEN DIAGNOSTIC) injection 1 mg, 1 mg, Intramuscular, Q15 Min PRN, Fernando Anthony, DO  •  insulin regular (humuLIN R,novoLIN R) injection 2-7 Units, 2-7 Units, Subcutaneous, Q6H, Fernando Anthony, DO  •  memantine (NAMENDA) tablet 10 mg, 10 mg, Oral,  Q12H, Fernando Anthony DO  •  risperiDONE (risperDAL) tablet 0.25 mg, 0.25 mg, Oral, Nightly, Fernando Anthony DO  •  [START ON 3/8/2022] sertraline (ZOLOFT) tablet 50 mg, 50 mg, Oral, Daily, Fernando Anthony DO  •  sodium chloride 0.9 % flush 10 mL, 10 mL, Intravenous, Q12H, Fernando Anthony DO, 10 mL at 03/07/22 1157  •  sodium chloride 0.9 % flush 10 mL, 10 mL, Intravenous, PRN, Fernando Anthony DO  •  sodium chloride 0.9 % infusion, 75 mL/hr, Intravenous, Continuous, Fernando Anthony DO, Last Rate: 75 mL/hr at 03/07/22 0956, 75 mL/hr at 03/07/22 0956    Medications: Scheduled Meds:atorvastatin, 40 mg, Oral, Nightly  cholecalciferol, 5,000 Units, Oral, Daily  [START ON 3/8/2022] donepezil, 10 mg, Oral, Daily  insulin regular, 2-7 Units, Subcutaneous, Q6H  memantine, 10 mg, Oral, Q12H  risperiDONE, 0.25 mg, Oral, Nightly  [START ON 3/8/2022] sertraline, 50 mg, Oral, Daily  sodium chloride, 10 mL, Intravenous, Q12H      Continuous Infusions:sodium chloride, 75 mL/hr, Last Rate: 75 mL/hr (03/07/22 0956)      PRN Meds:.dextrose  •  dextrose  •  glucagon (human recombinant)  •  sodium chloride    Vital Signs  Temp:  [96.5 °F (35.8 °C)-96.8 °F (36 °C)] 96.8 °F (36 °C)  Heart Rate:  [] 92  Resp:  [16-21] 20  BP: (115-170)/() 115/92    Physical Exam  Physical Exam  Vitals and nursing note reviewed.   Constitutional:       General: He is not in acute distress.     Appearance: Normal appearance. He is well-developed, well-groomed and overweight. He is not ill-appearing, toxic-appearing or diaphoretic.   HENT:      Head: Normocephalic and atraumatic.        Right Ear: Hearing normal.      Left Ear: Hearing normal.   Eyes:      Conjunctiva/sclera: Conjunctivae normal.      Pupils: Pupils are equal, round, and reactive to light.   Neck:      Trachea: Trachea normal.   Cardiovascular:      Rate and Rhythm: Normal rate and regular rhythm.   Pulmonary:      Effort: Pulmonary effort is normal. No tachypnea,  bradypnea, accessory muscle usage or respiratory distress.   Abdominal:      Palpations: Abdomen is soft.   Musculoskeletal:      Cervical back: Full passive range of motion without pain and neck supple.   Skin:     General: Skin is warm and dry.   Neurological:      Mental Status: He is alert.      GCS: GCS eye subscore is 4. GCS verbal subscore is 5. GCS motor subscore is 6.      Deep Tendon Reflexes:      Reflex Scores:       Tricep reflexes are 2+ on the right side and 2+ on the left side.       Bicep reflexes are 2+ on the right side and 2+ on the left side.       Brachioradialis reflexes are 2+ on the right side and 2+ on the left side.       Patellar reflexes are 2+ on the right side and 2+ on the left side.       Achilles reflexes are 2+ on the right side and 2+ on the left side.  Psychiatric:         Behavior: Behavior normal. Behavior is cooperative.         Neurologic Exam     Mental Status   Oriented to person.   Level of consciousness: arousable by verbal stimuli    Arousable to verbal stimuli.  Oriented x1 to person only.  Follows simple commands with minimal prompting showing thumbs up bilaterally.     Cranial Nerves   Cranial nerves II through XII intact.     CN III, IV, VI   Pupils are equal, round, and reactive to light.    Motor Exam   Moves all extremities equal and symmetric     Gait, Coordination, and Reflexes     Reflexes   Right brachioradialis: 2+  Left brachioradialis: 2+  Right biceps: 2+  Left biceps: 2+  Right triceps: 2+  Left triceps: 2+  Right patellar: 2+  Left patellar: 2+  Right achilles: 2+  Left achilles: 2+  Right plantar: normal  Left plantar: normal  Right Santizo: absent  Left Santizo: absent  Right ankle clonus: absent  Left ankle clonus: absent    Results Review:   Independent review and interpretation of imaging  Imaging Results (Last 24 Hours)     Procedure Component Value Units Date/Time    CT Head Without Contrast [[id number]] Collected: 03/07/22 1219     Updated:  03/07/22 1410    Narrative:      CT HEAD WO CONTRAST- 3/7/2022 11:20 AM CST     HISTORY: f/u imaging, found to have contusion and possible bleed at  outside hospital, also ? Left lacunar infarct       DOSE LENGTH PRODUCT: 809 mGy cm. Automated exposure control was also  utilized to decrease patient radiation dose.     Technique:   Axial CT of the brain without IV contrast. Sagittal and coronal  reformations are also provided for review. Soft tissue and bone kernels  are available for interpretation.     Comparison: None.     Findings:      There is no evidence of acute large vascular territory infarct. Moderate  global cortical atrophy. Underlying chronic small vessel ischemic  change. Age indeterminant left basal ganglia lacunar infarct. There is a  chronic appearing paramedian posterior left frontal lobe cortical  infarct. No intra-axial or extra-axial hemorrhage. No visualized mass  lesion or mass effect. Ventricles are nondilated. Posterior fossa  structures are unremarkable. The scalp and calvarium are intact.  Visualized paranasal sinuses and mastoids are clear.        Impression:      Impression:    1. No definite intracranial hemorrhage on this exam.  2. Moderate global cortical atrophy.  3. Chronic small vessel ischemic change. Age-indeterminate left basal  ganglia lacunar infarct and there is an additional chronic paramedian  posterior left frontal lobe cortical infarct.  This report was finalized on 03/07/2022 14:07 by Dr Jered Oliver, .        MRI brain:  MRI spine:   CT Head:  3/7/2022. [facility]      3/7/2022    CT c-spine:  CT t-spine:  CT l-spine:  X-ray:    I reviewed the patient's new clinical results.  Lab Results (last 24 hours)     Procedure Component Value Units Date/Time    POC Glucose Once [[id number]]  (Abnormal) Collected: 03/07/22 1223    Specimen: Blood Updated: 03/07/22 1234     Glucose 154 mg/dL      Comment: : [id number] Bolivarjacob WillamsMeter ID: [id number]       POC Glucose Once  [[id number]]  (Abnormal) Collected: 03/07/22 1150    Specimen: Blood Updated: 03/07/22 1202     Glucose 67 mg/dL      Comment: : [id number] Providence KentauracyMeter ID: [id number]       POC Glucose Once [[id number]]  (Abnormal) Collected: 03/07/22 0935    Specimen: Blood Updated: 03/07/22 0947     Glucose 154 mg/dL      Comment: : [id number] [place] NancyMeter ID: [id number]       POC Glucose Once [[id number]]  (Abnormal) Collected: 03/07/22 0915    Specimen: Blood Updated: 03/07/22 0926     Glucose 44 mg/dL      Comment: : [id number] [place] NancyMeter ID: [id number]       POC Glucose Once [[id number]]  (Abnormal) Collected: 03/07/22 0913    Specimen: Blood Updated: 03/07/22 0926     Glucose 54 mg/dL      Comment: : [id number] Claire NevescyMeter ID: [id number]           Jonathan Foote, APRN

## 2022-03-07 NOTE — PLAN OF CARE
Pt was seen for clinical bedside swallow evaluation. Upon arrival, pt was lying in bed asleep. Pt was alerted and repositioned upright. Oral mechanism exam was performed.Pt demonstrated decreased lingual protrusion and generalized oral motor weakness. Pt required multiple verbal and visual cues to complete examination. Pt consumed trials of x3 puree, x2 honey thick, x2 nectar thick, and x2 thin liquids. Audible swallow noted with thin liquids and multiple swallows noted with puree but no other overt s/s of aspiration were noted. Regular solids were not trialed d/t pt current cognitive status.       Recommendations:  1. Puree; honey thick  2. Medications crushed in pudding or applesauce  3. Pt okay for ice chips or small sips of water after proper oral care and with supervision.   4. 1:1 supervision and assistance with meals  5. General aspiration precautions  6. SLP will continue to follow and treat    Marina Hays Speech Therapy Student  14:44 CST.  03/07/22            Goal Outcome Evaluation:  Plan of Care Reviewed With: (P) patient, caregiver

## 2022-03-07 NOTE — H&P
[facility] Medicine Services  HISTORY AND PHYSICAL    Date of Admission: 3/7/2022  Primary Care Physician: Margaux Fisher APRN    Subjective     Chief Complaint: brain contusions/cva    History of Present Illness  Patient is a 66-year-old male with a history of early onset dementia, hypertension, hyperlipidemia, diabetes.  He apparently was just recently hospitalized at [facility] with psychiatry for behavioral disturbances.  He was admitted on 2/26 and discharged on 3/3.  Apparently last night he was found unresponsive in his bathroom with a laceration over his eye.  He was brought to [facility] where head CT showed a brain contusion with a possible hemorrhagic component as well as a possible small lacunar stroke.  His labs otherwise were okay with normal renal function and electrolytes.  CBC slightly elevated at 13.  He reportedly was hypotensive in the ER there but has been normotensive to hypertensive since arrival to our ICU.  Was significantly confused on arrival unable to answer questions.  I did call his wife who told me that he has a living will which is a DNR/DNI.  She says he is been acting off since he was sent to [place] and discharged last week.  This essentially is how he has been as far as confusion since that hospital stay but overall that is a decline from prior.  He has not had any other complaints to her.      Review of Systems   Unable to assess due to patient factors    Past Medical History:  Dementia, htn, diabetes  Past Surgical History:No past surgical history on file.  Social History:  reports that he has been smoking cigarettes. He has been smoking about 1.00 pack per day. He does not have any smokeless tobacco history on file. He reports previous alcohol use.    Family History: htn    Allergies:  No Known Allergies    Medications:  Prior to Admission medications    Medication Sig Start Date End Date Taking? Authorizing Provider    aspirin 81 MG chewable tablet Chew 81 mg Daily.    Jacob Rodas MD   atorvastatin (LIPITOR) 40 MG tablet Take 1 tablet by mouth Daily. Indications: Cerebrovascular Accident or Stroke, High Amount of Fats in the Blood 3/4/22   Yoan Floyd II, MD   Canagliflozin (Invokana) 300 MG tablet tablet Take 300 mg by mouth Daily.    Jacob Rodas MD   cholecalciferol (VITAMIN D3) 1.25 MG (89995 UT) capsule Take 1 capsule by mouth Every 7 (Seven) Days. Indications: Vitamin D Deficiency 3/6/22   Yoan Floyd II, MD   clopidogrel (PLAVIX) 75 MG tablet Take 1 tablet by mouth Daily. Indications: Cerebrovascular Accident or Stroke 3/4/22   Yoan Floyd II, MD   donepezil (ARICEPT) 10 MG tablet Take 10 mg by mouth Daily.    Jacob Rodas MD   glipizide (GLUCOTROL) 5 MG tablet Take 5 mg by mouth 2 (Two) Times a Day Before Meals.    Jacob Rodas MD   lisinopril (PRINIVIL,ZESTRIL) 10 MG tablet Take 10 mg by mouth Daily.    Jacob Rodas MD   melatonin 1 MG tablet Take 1.5 tablets by mouth Every Night. Indications: Circadian entrainment 3/3/22   Yoan Floyd II, MD   memantine (NAMENDA) 10 MG tablet Take 10 mg by mouth 2 (Two) Times a Day.    Jacob Rodas MD   metFORMIN (GLUCOPHAGE) 1000 MG tablet Take 1,000 mg by mouth 2 (Two) Times a Day With Meals.    Jacob Rodas MD   risperiDONE (risperDAL) 0.25 MG tablet Take 1 tablet by mouth Every Night. Indications: Behavioral Disorders associated with Dementia, Visual hallucination related to cognitive disorder 3/3/22   Yoan Floyd II, MD   sertraline (ZOLOFT) 50 MG tablet Take 1 tablet by mouth Daily. Indications: Mood & Anxiety related to neurocognitive disorder 3/4/22   Yoan Floyd II, MD     I have utilized all available immediate resources to obtain, update, and review the patient's current medications.    Objective     Vital Signs: /73   Pulse 92   SpO2 100%    Physical Exam   GEN: Awake, alert, interactive but confused, in NAD  HEENT: sutured lac above R eye brow, PERRLA, Anicteric, Trachea midline  Lungs: no wheezing/rales/rhonchi  Heart: RRR, +S1/s2, no rub  ABD: soft, nt/nd, +BS, no guarding/rebound  Extremities: atraumatic, no cyanosis, no edema  Skin: no rashes or petechiae  Neuro: AAOx1, العلي extremities, no obvious gross deficits but difficult to examine  Psych: flat affect        Results Reviewed:  Lab Results (last 24 hours)     Procedure Component Value Units Date/Time    POC Glucose Once [[id number]]  (Abnormal) Collected: 03/07/22 0915    Specimen: Blood Updated: 03/07/22 0926     Glucose 44 mg/dL      Comment: : [id number] BantrcyMeter ID: [id number]       POC Glucose Once [[id number]]  (Abnormal) Collected: 03/07/22 0913    Specimen: Blood Updated: 03/07/22 0926     Glucose 54 mg/dL      Comment: : [id number] BantrcyMeter ID: [id number]           Imaging Results (Last 24 Hours)     ** No results found for the last 24 hours. **        Labs and imaging from outside hospital reviewed in our chart.  Normal chemistries.  CBC with slight white count elevation at 13.  UA without overt signs of infection.  UDS positive for benzos.  Head CT with Jessa note of 1.3 cm contusion and possible bleed as well as new lacunar infarct.    I have personally reviewed and interpreted the radiology studies and ECG obtained at time of admission.     Assessment / Plan     Assessment:   Active Hospital Problems    Diagnosis    • **CVA (cerebral vascular accident) (HCC)    • Brain contusion (HCC)    • Type 2 diabetes mellitus without complication, with long-term current use of insulin (HCC)    • HTN (hypertension)    • Dementia with behavioral problem (HCC)    • Hyperlipemia      Plan:   #1 CVA -patient reportedly has been off since the end of February for mental standpoint.  He had a head CT on 2/25 prior to admission to [facility] which was nonacute.  Head CT at  outside facility yesterday after fall shows a possible lacunar infarct that was not there on 2/25 per report.  We will plan for a stroke work-up.  We will have neurology see the patient.  Will repeat head CT and for now to compare given contusion.  Patient currently to active to stay flat for MRI.  I have to reassess.  Check a 2D echo and NICS.  For now continue with statin as prior.  We will hold home aspirin and Plavix given potential brain bleed until follow-up imaging done and seen by neurosurgery.     #2  Brain contusion -1.3 cm with possible hemorrhagic component per outside report.  We will have neurosurgery eval.  As above we will repeat head CT due to compare.    #3 DM2 -takes long-acting insulin at home.  Presented here hyperglycemic.  Treated.  Continue to monitor sugars every 6.  Currently NPO.  Hypoglycemia protocol in place.    #4 essential hypertension -reportedly hypotensive at outside facility but doing okay since arrival here.  Monitor closely.    #5 hyperlipidemia -statin    #6 early onset dementia -noted.  On Aricept and donepezil.  Was recently hospitalized for changes in his mental status but he persisted through that state after discharge.  Unclear if it is any association with potential stroke we are seeing now although unlikely as it is not a frontal stroke.  Again we will get neuro input.    Code Status/Advanced Care Plan: DNR/DNI    The patient's surrogate decision maker is his wife.     I discussed my findings and recommendations with the wife.    Estimated length of stay is 2+ days.     The patient was seen and examined by me on 3/7/22 at 9:05 AM.    Electronically signed by Fernando Anthony DO, 03/07/22, 10:09 CST.

## 2022-03-07 NOTE — PLAN OF CARE
Goal Outcome Evaluation:  Plan of Care Reviewed With: other (see comments), patient        Progress: no change  Outcome Evaluation: Initial nutrition assessment. Per ST orders, he is to receive a pureed diet with honey thick liquids. No intake recorded yet. Pt/family unsure of recent weight changes. No weight hx available. Current BMI is 26.75. Will follow to establish po intake to determine further nutrition needs.
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
## 2022-03-08 PROBLEM — R55 SYNCOPE: Status: ACTIVE | Noted: 2022-03-07

## 2022-03-08 PROBLEM — S06.2XAA BRAIN CONTUSION: Status: RESOLVED | Noted: 2022-03-07 | Resolved: 2022-03-08

## 2022-03-08 PROBLEM — S01.91XA LACERATION OF HEAD: Status: ACTIVE | Noted: 2022-03-08

## 2022-03-08 LAB
ALBUMIN SERPL-MCNC: 3.9 G/DL (ref 3.5–5.2)
ALBUMIN/GLOB SERPL: 1.8 G/DL
ALP SERPL-CCNC: 100 U/L (ref 39–117)
ALT SERPL W P-5'-P-CCNC: 22 U/L (ref 1–41)
ANION GAP SERPL CALCULATED.3IONS-SCNC: 10 MMOL/L (ref 5–15)
AST SERPL-CCNC: 37 U/L (ref 1–40)
BASOPHILS # BLD AUTO: 0.07 10*3/MM3 (ref 0–0.2)
BASOPHILS NFR BLD AUTO: 0.8 % (ref 0–1.5)
BILIRUB SERPL-MCNC: 0.8 MG/DL (ref 0–1.2)
BUN SERPL-MCNC: 8 MG/DL (ref 8–23)
BUN/CREAT SERPL: 15.7 (ref 7–25)
CALCIUM SPEC-SCNC: 9.1 MG/DL (ref 8.6–10.5)
CHLORIDE SERPL-SCNC: 100 MMOL/L (ref 98–107)
CHOLEST SERPL-MCNC: 89 MG/DL (ref 0–200)
CO2 SERPL-SCNC: 27 MMOL/L (ref 22–29)
CREAT SERPL-MCNC: 0.51 MG/DL (ref 0.76–1.27)
DEPRECATED RDW RBC AUTO: 39.2 FL (ref 37–54)
EGFRCR SERPLBLD CKD-EPI 2021: 113.2 ML/MIN/1.73
EOSINOPHIL # BLD AUTO: 0.44 10*3/MM3 (ref 0–0.4)
EOSINOPHIL NFR BLD AUTO: 4.9 % (ref 0.3–6.2)
ERYTHROCYTE [DISTWIDTH] IN BLOOD BY AUTOMATED COUNT: 12.1 % (ref 12.3–15.4)
GLOBULIN UR ELPH-MCNC: 2.2 GM/DL
GLUCOSE BLDC GLUCOMTR-MCNC: 147 MG/DL (ref 70–130)
GLUCOSE BLDC GLUCOMTR-MCNC: 176 MG/DL (ref 70–130)
GLUCOSE BLDC GLUCOMTR-MCNC: 227 MG/DL (ref 70–130)
GLUCOSE BLDC GLUCOMTR-MCNC: 57 MG/DL (ref 70–130)
GLUCOSE SERPL-MCNC: 101 MG/DL (ref 65–99)
HBA1C MFR BLD: 6.7 % (ref 4.8–5.6)
HCT VFR BLD AUTO: 31.5 % (ref 37.5–51)
HDLC SERPL-MCNC: 36 MG/DL (ref 40–60)
HGB BLD-MCNC: 11.1 G/DL (ref 13–17.7)
IMM GRANULOCYTES # BLD AUTO: 0.03 10*3/MM3 (ref 0–0.05)
IMM GRANULOCYTES NFR BLD AUTO: 0.3 % (ref 0–0.5)
LDLC SERPL CALC-MCNC: 36 MG/DL (ref 0–100)
LDLC/HDLC SERPL: 0.99 {RATIO}
LYMPHOCYTES # BLD AUTO: 1.41 10*3/MM3 (ref 0.7–3.1)
LYMPHOCYTES NFR BLD AUTO: 15.7 % (ref 19.6–45.3)
MAGNESIUM SERPL-MCNC: 1.3 MG/DL (ref 1.6–2.4)
MCH RBC QN AUTO: 31 PG (ref 26.6–33)
MCHC RBC AUTO-ENTMCNC: 35.2 G/DL (ref 31.5–35.7)
MCV RBC AUTO: 88 FL (ref 79–97)
MONOCYTES # BLD AUTO: 0.58 10*3/MM3 (ref 0.1–0.9)
MONOCYTES NFR BLD AUTO: 6.4 % (ref 5–12)
NEUTROPHILS NFR BLD AUTO: 6.47 10*3/MM3 (ref 1.7–7)
NEUTROPHILS NFR BLD AUTO: 71.9 % (ref 42.7–76)
NRBC BLD AUTO-RTO: 0 /100 WBC (ref 0–0.2)
PLATELET # BLD AUTO: 287 10*3/MM3 (ref 140–450)
PMV BLD AUTO: 9.7 FL (ref 6–12)
POTASSIUM SERPL-SCNC: 3.7 MMOL/L (ref 3.5–5.2)
PROT SERPL-MCNC: 6.1 G/DL (ref 6–8.5)
RBC # BLD AUTO: 3.58 10*6/MM3 (ref 4.14–5.8)
SODIUM SERPL-SCNC: 137 MMOL/L (ref 136–145)
TRIGL SERPL-MCNC: 86 MG/DL (ref 0–150)
VLDLC SERPL-MCNC: 17 MG/DL (ref 5–40)
WBC NRBC COR # BLD: 9 10*3/MM3 (ref 3.4–10.8)

## 2022-03-08 PROCEDURE — 83735 ASSAY OF MAGNESIUM: CPT | Performed by: INTERNAL MEDICINE

## 2022-03-08 PROCEDURE — 85025 COMPLETE CBC W/AUTO DIFF WBC: CPT | Performed by: INTERNAL MEDICINE

## 2022-03-08 PROCEDURE — 92526 ORAL FUNCTION THERAPY: CPT

## 2022-03-08 PROCEDURE — 97165 OT EVAL LOW COMPLEX 30 MIN: CPT | Performed by: OCCUPATIONAL THERAPIST

## 2022-03-08 PROCEDURE — 80053 COMPREHEN METABOLIC PANEL: CPT | Performed by: INTERNAL MEDICINE

## 2022-03-08 PROCEDURE — 83036 HEMOGLOBIN GLYCOSYLATED A1C: CPT | Performed by: INTERNAL MEDICINE

## 2022-03-08 PROCEDURE — 97162 PT EVAL MOD COMPLEX 30 MIN: CPT

## 2022-03-08 PROCEDURE — 80061 LIPID PANEL: CPT | Performed by: INTERNAL MEDICINE

## 2022-03-08 PROCEDURE — 25010000002 MAGNESIUM SULFATE 2 GM/50ML SOLUTION: Performed by: INTERNAL MEDICINE

## 2022-03-08 PROCEDURE — 82962 GLUCOSE BLOOD TEST: CPT

## 2022-03-08 PROCEDURE — 99233 SBSQ HOSP IP/OBS HIGH 50: CPT | Performed by: PSYCHIATRY & NEUROLOGY

## 2022-03-08 RX ORDER — RISPERIDONE 1 MG/1
0.5 TABLET ORAL NIGHTLY
Status: DISCONTINUED | OUTPATIENT
Start: 2022-03-08 | End: 2022-03-10 | Stop reason: HOSPADM

## 2022-03-08 RX ORDER — MAGNESIUM SULFATE HEPTAHYDRATE 40 MG/ML
2 INJECTION, SOLUTION INTRAVENOUS ONCE
Status: COMPLETED | OUTPATIENT
Start: 2022-03-08 | End: 2022-03-08

## 2022-03-08 RX ORDER — ASPIRIN 81 MG/1
81 TABLET ORAL DAILY
Status: DISCONTINUED | OUTPATIENT
Start: 2022-03-08 | End: 2022-03-10 | Stop reason: HOSPADM

## 2022-03-08 RX ADMIN — SERTRALINE HYDROCHLORIDE 50 MG: 50 TABLET, FILM COATED ORAL at 09:08

## 2022-03-08 RX ADMIN — ASPIRIN 81 MG: 81 TABLET, COATED ORAL at 16:27

## 2022-03-08 RX ADMIN — RISPERIDONE 0.5 MG: 1 TABLET, FILM COATED ORAL at 20:48

## 2022-03-08 RX ADMIN — Medication 10 ML: at 20:48

## 2022-03-08 RX ADMIN — ATORVASTATIN CALCIUM 40 MG: 40 TABLET, FILM COATED ORAL at 20:48

## 2022-03-08 RX ADMIN — DONEPEZIL HYDROCHLORIDE 10 MG: 10 TABLET, FILM COATED ORAL at 09:08

## 2022-03-08 RX ADMIN — MEMANTINE HYDROCHLORIDE 10 MG: 5 TABLET, FILM COATED ORAL at 20:48

## 2022-03-08 RX ADMIN — Medication 5000 UNITS: at 09:08

## 2022-03-08 RX ADMIN — MAGNESIUM SULFATE HEPTAHYDRATE 2 G: 40 INJECTION, SOLUTION INTRAVENOUS at 10:28

## 2022-03-08 RX ADMIN — Medication 10 ML: at 09:08

## 2022-03-08 RX ADMIN — MEMANTINE HYDROCHLORIDE 10 MG: 5 TABLET, FILM COATED ORAL at 09:08

## 2022-03-08 NOTE — PLAN OF CARE
Goal Outcome Evaluation:  Plan of Care Reviewed With: patient        Progress: improving  Outcome Evaluation: See note

## 2022-03-08 NOTE — CASE MANAGEMENT/SOCIAL WORK
Continued Stay Note  [facility]     Patient Name: Diego Brizuela  MRN: [id number]  Today's Date: 3/8/2022    Admit Date: 3/7/2022     Discharge Plan     Row Name 03/08/22 1208       Plan    Plan SNF - Referral pending at [facility]    Patient/Family in Agreement with Plan yes    Plan Comments Patient spouse called and requested a referral to [facility].  Referral made, awaiting response.               Discharge Codes    No documentation.                     SONDRA Key
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
## 2022-03-08 NOTE — CASE MANAGEMENT/SOCIAL WORK
Discharge Planning Assessment  [facility]     Patient Name: Diego Brizuela  MRN: [id number]  Today's Date: 3/8/2022    Admit Date: 3/7/2022     Discharge Needs Assessment     Row Name 03/08/22 1011       Living Environment    People in Home spouse    Name(s) of People in Home Spouse - Maxine Brizuela    Current Living Arrangements home    Primary Care Provided by self;spouse/significant other    Provides Primary Care For no one, unable/limited ability to care for self    Family Caregiver if Needed spouse    Quality of Family Relationships supportive;helpful;involved    Able to Return to Prior Arrangements yes       Resource/Environmental Concerns    Resource/Environmental Concerns none       Transition Planning    Patient/Family Anticipates Transition to home with family    Patient/Family Anticipated Services at Transition none    Transportation Anticipated family or friend will provide       Discharge Needs Assessment    Readmission Within the Last 30 Days current reason for admission unrelated to previous admission    Equipment Currently Used at Home none    Concerns to be Addressed no discharge needs identified    Current Discharge Risk chronically ill;cognitively impaired    Discharge Coordination/Progress Patient currently admitted to CCU, events noted.  Patient was recently hospitalized at [facility] from 2/26/22 - 3/3/22.  Patient resides at home with his spouse, has a PCP and RX coverage.  SW attempted to reach patient's spouse, voicemail not set up.  Anticipate discharge home with spouse at this time.  SW following for needs.               Discharge Plan    No documentation.               Continued Care and Services - Admitted Since 3/7/2022    Coordination has not been started for this encounter.          Demographic Summary    No documentation.                Functional Status    No documentation.                Psychosocial    No documentation.                 Abuse/Neglect    No documentation.                Legal    No documentation.                Substance Abuse    No documentation.                Patient Forms    No documentation.                   TRACY KeyW

## 2022-03-08 NOTE — PLAN OF CARE
Goal Outcome Evaluation:  Plan of Care Reviewed With: patient           Outcome Evaluation: OT eval completed.  Mr. Brizuela is oriented to self, appears confused, able to follow 1 step commands approx 50-75% of the time.  He demo's imbalance, R side neglect, fair- endurance, decreased ability to follow commands, & impaired safety.  Much of his deficits appear to be baseline with a recent decline.  Per chart review pt was staying with spouse at home. He would benefit from cont'd OT tx to improve his fxl act vickie, edu family, increase his balance, & improve fxn.  He will require 24/7 S. Dc rec progress pending.

## 2022-03-08 NOTE — DISCHARGE PLACEMENT REQUEST
"To: [facility]    From: Amber Resendiz [phone]        Diego Lew (66 y.o. Male)             Date of Birth   1955    Social Security Number       Address   [address] [facility] [address]    Home Phone   [phone]    MRN   [id number]       Voodoo   Other    Marital Status                               Admission Date   3/7/22    Admission Type   Urgent    Admitting Provider   Fernando Anthony DO    Attending Provider   Fernando Anthony DO    Department, Room/Bed   [facility] 3A, 350/1       Discharge Date       Discharge Disposition       Discharge Destination                               Attending Provider: Fernando Anthony DO    Allergies: No Known Allergies    Isolation: None   Infection: None   Code Status: No CPR   Advance Care Planning Activity    Ht: 170.2 cm (67.01\")   Wt: 77.2 kg (170 lb 3.1 oz)    Admission Cmt: None   Principal Problem: Syncope [R55]                 Active Insurance as of 3/7/2022     Primary Coverage     Payor Plan Insurance Group Employer/Plan Group    HUMANA MEDICARE REPLACEMENT HUMANA MEDICARE REPLACEMENT [id number]     Payor Plan Address Payor Plan Phone Number Payor Plan Fax Number Effective Dates    [address] [address] [phone]  1/1/2022 - None Entered    [facility] [id number]       Subscriber Name Subscriber Birth Date Member ID       DIEGO LEW 1955 [id number]                 Emergency Contacts      (Rel.) Home Phone Work Phone Mobile Phone    Maxine Lew (Spouse) [phone] -- [phone]               History & Physical      Fernando Anthony DO at 03/07/22 0907              [facility] Medicine Services  HISTORY AND PHYSICAL    Date of Admission: 3/7/2022  Primary Care Physician: Margaux Fisher APRN    Subjective     Chief Complaint: brain contusions/cva    History of Present Illness  Patient is a 66-year-old male with a history of early " onset dementia, hypertension, hyperlipidemia, diabetes.  He apparently was just recently hospitalized at [facility] with psychiatry for behavioral disturbances.  He was admitted on 2/26 and discharged on 3/3.  Apparently last night he was found unresponsive in his bathroom with a laceration over his eye.  He was brought to [place] ER where head CT showed a brain contusion with a possible hemorrhagic component as well as a possible small lacunar stroke.  His labs otherwise were okay with normal renal function and electrolytes.  CBC slightly elevated at 13.  He reportedly was hypotensive in the ER there but has been normotensive to hypertensive since arrival to our ICU.  Was significantly confused on arrival unable to answer questions.  I did call his wife who told me that he has a living will which is a DNR/DNI.  She says he is been acting off since he was sent to [place] and discharged last week.  This essentially is how he has been as far as confusion since that hospital stay but overall that is a decline from prior.  He has not had any other complaints to her.      Review of Systems   Unable to assess due to patient factors    Past Medical History:  Dementia, htn, diabetes  Past Surgical History:No past surgical history on file.  Social History:  reports that he has been smoking cigarettes. He has been smoking about 1.00 pack per day. He does not have any smokeless tobacco history on file. He reports previous alcohol use.    Family History: htn    Allergies:  No Known Allergies    Medications:  Prior to Admission medications    Medication Sig Start Date End Date Taking? Authorizing Provider   aspirin 81 MG chewable tablet Chew 81 mg Daily.    Provider, MD Jacob   atorvastatin (LIPITOR) 40 MG tablet Take 1 tablet by mouth Daily. Indications: Cerebrovascular Accident or Stroke, High Amount of Fats in the Blood 3/4/22   Yoan Floyd II, MD   Canagliflozin (Invokana) 300 MG tablet tablet  Take 300 mg by mouth Daily.    Jacob Rodas MD   cholecalciferol (VITAMIN D3) 1.25 MG (09521 UT) capsule Take 1 capsule by mouth Every 7 (Seven) Days. Indications: Vitamin D Deficiency 3/6/22   Yoan Floyd II, MD   clopidogrel (PLAVIX) 75 MG tablet Take 1 tablet by mouth Daily. Indications: Cerebrovascular Accident or Stroke 3/4/22   Yoan Floyd II, MD   donepezil (ARICEPT) 10 MG tablet Take 10 mg by mouth Daily.    Jacob Rodas MD   glipizide (GLUCOTROL) 5 MG tablet Take 5 mg by mouth 2 (Two) Times a Day Before Meals.    Jacob Rodas MD   lisinopril (PRINIVIL,ZESTRIL) 10 MG tablet Take 10 mg by mouth Daily.    Jacob Rodas MD   melatonin 1 MG tablet Take 1.5 tablets by mouth Every Night. Indications: Circadian entrainment 3/3/22   Yoan Floyd II, MD   memantine (NAMENDA) 10 MG tablet Take 10 mg by mouth 2 (Two) Times a Day.    Jacob Rodas MD   metFORMIN (GLUCOPHAGE) 1000 MG tablet Take 1,000 mg by mouth 2 (Two) Times a Day With Meals.    Jacob Rodas MD   risperiDONE (risperDAL) 0.25 MG tablet Take 1 tablet by mouth Every Night. Indications: Behavioral Disorders associated with Dementia, Visual hallucination related to cognitive disorder 3/3/22   Yoan Floyd II, MD   sertraline (ZOLOFT) 50 MG tablet Take 1 tablet by mouth Daily. Indications: Mood & Anxiety related to neurocognitive disorder 3/4/22   Yoan Floyd II, MD     I have utilized all available immediate resources to obtain, update, and review the patient's current medications.    Objective     Vital Signs: /73   Pulse 92   SpO2 100%   Physical Exam   GEN: Awake, alert, interactive but confused, in NAD  HEENT: sutured lac above R eye brow, PERRLA, Anicteric, Trachea midline  Lungs: no wheezing/rales/rhonchi  Heart: RRR, +S1/s2, no rub  ABD: soft, nt/nd, +BS, no guarding/rebound  Extremities: atraumatic, no cyanosis, no edema  Skin: no rashes or  petechiae  Neuro: AAOx1, العلي extremities, no obvious gross deficits but difficult to examine  Psych: flat affect        Results Reviewed:  Lab Results (last 24 hours)     Procedure Component Value Units Date/Time    POC Glucose Once [[id number]]  (Abnormal) Collected: 03/07/22 0915    Specimen: Blood Updated: 03/07/22 0926     Glucose 44 mg/dL      Comment: : [id number] Claire NancyMeter ID: [id number]       POC Glucose Once [[id number]]  (Abnormal) Collected: 03/07/22 0913    Specimen: Blood Updated: 03/07/22 0926     Glucose 54 mg/dL      Comment: : [id number] Claire NancyMeter ID: [id number]           Imaging Results (Last 24 Hours)     ** No results found for the last 24 hours. **        Labs and imaging from outside hospital reviewed in our chart.  Normal chemistries.  CBC with slight white count elevation at 13.  UA without overt signs of infection.  UDS positive for benzos.  Head CT with Jessa note of 1.3 cm contusion and possible bleed as well as new lacunar infarct.    I have personally reviewed and interpreted the radiology studies and ECG obtained at time of admission.     Assessment / Plan     Assessment:   Active Hospital Problems    Diagnosis    • **CVA (cerebral vascular accident) (HCC)    • Brain contusion (HCC)    • Type 2 diabetes mellitus without complication, with long-term current use of insulin (HCC)    • HTN (hypertension)    • Dementia with behavioral problem (HCC)    • Hyperlipemia      Plan:   #1 CVA -patient reportedly has been off since the end of February for mental standpoint.  He had a head CT on 2/25 prior to admission to [place] which was nonacute.  Head CT at outside facility yesterday after fall shows a possible lacunar infarct that was not there on 2/25 per report.  We will plan for a stroke work-up.  We will have neurology see the patient.  Will repeat head CT and for now to compare given contusion.  Patient currently to active to stay flat for MRI.  I have to  reassess.  Check a 2D echo and NICS.  For now continue with statin as prior.  We will hold home aspirin and Plavix given potential brain bleed until follow-up imaging done and seen by neurosurgery.     #2  Brain contusion -1.3 cm with possible hemorrhagic component per outside report.  We will have neurosurgery eval.  As above we will repeat head CT due to compare.    #3 DM2 -takes long-acting insulin at home.  Presented here hyperglycemic.  Treated.  Continue to monitor sugars every 6.  Currently NPO.  Hypoglycemia protocol in place.    #4 essential hypertension -reportedly hypotensive at outside facility but doing okay since arrival here.  Monitor closely.    #5 hyperlipidemia -statin    #6 early onset dementia -noted.  On Aricept and donepezil.  Was recently hospitalized for changes in his mental status but he persisted through that state after discharge.  Unclear if it is any association with potential stroke we are seeing now although unlikely as it is not a frontal stroke.  Again we will get neuro input.    Code Status/Advanced Care Plan: DNR/DNI    The patient's surrogate decision maker is his wife.     I discussed my findings and recommendations with the wife.    Estimated length of stay is 2+ days.     The patient was seen and examined by me on 3/7/22 at 9:05 AM.    Electronically signed by Fernando Anthony DO, 03/07/22, 10:09 CST.                Electronically signed by Fernando Anthony DO at 03/07/22 1010         Current Facility-Administered Medications   Medication Dose Route Frequency Provider Last Rate Last Admin   • aspirin EC tablet 81 mg  81 mg Oral Daily Fernando Anthony DO       • atorvastatin (LIPITOR) tablet 40 mg  40 mg Oral Nightly Fernando Anthony DO   40 mg at 03/07/22 2020   • cholecalciferol (VITAMIN D3) tablet 5,000 Units  5,000 Units Oral Daily Fernando Anthony DO   5,000 Units at 03/08/22 0908   • dextrose (D50W) (25 g/50 mL) IV injection 25 g  25 g Intravenous Q15 Min PRN Raj  "Fernando HERNANDES DO   25 g at 03/07/22 2354   • dextrose (GLUTOSE) oral gel 15 g  15 g Oral Q15 Min PRN Fernando Anthony DO       • donepezil (ARICEPT) tablet 10 mg  10 mg Oral Daily Fernando Anthony DO   10 mg at 03/08/22 0908   • glucagon (human recombinant) (GLUCAGEN DIAGNOSTIC) injection 1 mg  1 mg Intramuscular Q15 Min PRN Fernando Anthony DO       • memantine (NAMENDA) tablet 10 mg  10 mg Oral Q12H Fernando Anthony DO   10 mg at 03/08/22 0908   • risperiDONE (risperDAL) tablet 0.25 mg  0.25 mg Oral Nightly Fernando Anthony DO   0.25 mg at 03/07/22 2020   • sertraline (ZOLOFT) tablet 50 mg  50 mg Oral Daily Fernando Anthony DO   50 mg at 03/08/22 0908   • sodium chloride 0.9 % flush 10 mL  10 mL Intravenous Q12H Fernando Anthony DO   10 mL at 03/08/22 0908   • sodium chloride 0.9 % flush 10 mL  10 mL Intravenous PRN Fernando Anthony DO            Physician Progress Notes (most recent note)      Cleveland Correa PA-C at 03/08/22 1004            Neurology Progress Note      Chief Complaint:    Dementia    Subjective     Subjective:  Patient is much more alert this morning, but remains confused.  When we first entered the room, he asked about seeing another person that had \"just walked by.\"  When asked him about hallucinations, it sounds as though he has been having recurrent hallucinations.    Very little success in the ability to redirect the patient or cue him to answer orientation questions successfully.  When he is told he is in the hospital, he seems to agree and understand that that is accurate, but otherwise, independently is unable to provide accurate orientation devices.    Patient is restless this morning and pulling off his hospital gown and bedding.  He has soiled himself.    Follow-up CT of the head was negative for any acute intracranial hemorrhage.  Evidence of remote strokes.    Patient remains on Aricept, Namenda, Risperdal and Zoloft.      Past Medical History:   Diagnosis Date   • Alzheimer's " dementia (HCC)    • Diabetes mellitus (HCC)    • Elevated cholesterol    • Hypertension    • Nephrolithiasis      Past Surgical History:   Procedure Laterality Date   • URETERAL STENT INSERTION       History reviewed. No pertinent family history.  Social History     Tobacco Use   • Smoking status: Current Every Day Smoker     Packs/day: 1.00     Types: Cigarettes   Vaping Use   • Vaping Use: Unknown   Substance Use Topics   • Alcohol use: Not Currently   • Drug use: Not Currently       Medications:  Current Facility-Administered Medications   Medication Dose Route Frequency Provider Last Rate Last Admin   • aspirin EC tablet 81 mg  81 mg Oral Daily Fernando Anthony DO       • atorvastatin (LIPITOR) tablet 40 mg  40 mg Oral Nightly Fernando Anthony DO   40 mg at 03/07/22 2020   • cholecalciferol (VITAMIN D3) tablet 5,000 Units  5,000 Units Oral Daily Jyoti Sotelo MD   5,000 Units at 03/08/22 0908   • dextrose (D50W) (25 g/50 mL) IV injection 25 g  25 g Intravenous Q15 Min PRN Fernando Anthony DO   25 g at 03/07/22 2354   • dextrose (GLUTOSE) oral gel 15 g  15 g Oral Q15 Min PRN Fernando Anthony DO       • donepezil (ARICEPT) tablet 10 mg  10 mg Oral Daily Fernando Anthony DO   10 mg at 03/08/22 0908   • glucagon (human recombinant) (GLUCAGEN DIAGNOSTIC) injection 1 mg  1 mg Intramuscular Q15 Min PRN Fernando Anthony DO       • insulin regular (humuLIN R,novoLIN R) injection 2-7 Units  2-7 Units Subcutaneous Q6H Fernando Anthony DO       • magnesium sulfate 2g/50 mL (PREMIX) infusion  2 g Intravenous Once Fernando Anthony DO       • memantine (NAMENDA) tablet 10 mg  10 mg Oral Q12H Fernando Anthony DO   10 mg at 03/08/22 0908   • risperiDONE (risperDAL) tablet 0.25 mg  0.25 mg Oral Nightly Fernando Anthony DO   0.25 mg at 03/07/22 2020   • sertraline (ZOLOFT) tablet 50 mg  50 mg Oral Daily Fernando Anthony DO   50 mg at 03/08/22 0908   • sodium chloride 0.9 % flush 10 mL  10 mL Intravenous Q12H  Fernando Anthony DO   10 mL at 03/08/22 0908   • sodium chloride 0.9 % flush 10 mL  10 mL Intravenous PRN RajFernandoDO           Allergies:    Patient has no known allergies.    Review of Systems:   -A 14 point review of systems is completed and is negative.      Objective      Vital Signs  Temp:  [96.8 °F (36 °C)-97.8 °F (36.6 °C)] 97.6 °F (36.4 °C)  Heart Rate:  [] 95  Resp:  [16-21] 18  BP: ()/(55-98) 108/61    Physical Exam:    General Exam:  Head:  Normocephalic, atraumatic.  HEENT: PERRLA.  Full EOM.  Neck:  No lymphadenopathy, thyromegaly or bruit.  Cardiac:  Regular rate and rhythm.  Normal S1, S2.  No murmur, rub or gallop.  Lungs:  Clear to auscultation bilaterally.  No wheeze, rales or rhonchi.  Abdomen:  Non-tender, Non-distended.  Bowel sounds normoactive.  Extremities: Full peripheral pulses.  No clubbing, cyanosis or edema.  Skin: Laceration repair to the right supraorbital region with associated hematoma.      Neurologic Exam:  Mental Status:    -Awake. Alert, but not oriented.  -Restless and attempting to get out of bed and remove his clothing and bedding.  -Does not consistently follow simple commands.     CN II:  Full visual fields with confrontation.  Pupils equally reactive to light.  CN III, IV, VI:  Extraocular muscles function intact with no nystagmus.  CN V:  Facial sensory is symmetric.  CN VII:  Facial motor symmetric.  CN VIII:  Gross hearing intact bilaterally.  CN IX/X:  Palate elevates symmetrically.  CN XI:  Shoulder shrug symmetric.  CN XII:  Tongue is midline on protrusion.     Motor:    -5/5 in bilateral biceps, triceps, brachioradialis, wrist extensors and intrinsic muscles of the hand.    -5/5 in bilateral hip flexors, quadriceps, hamstrings, gastrocsoleus complex, anterior tibialis and extensor hallucis longus.       Deep Tendon Reflexes:  -Right              Biceps: 2+         Triceps: 2+      Brachioradialis: 2+              Patella: 2+       Ankle: 2+            -Left              Biceps: 2+         Triceps: 2+      Brachioradialis: 2+              Patella: 2+       Ankle: 2+         -Patient has frontal lobe release signs with pucker and rooting.        Tone (Modified Barry Scale):  No appreciable increase in tone or rigidity noted.     Sensory:  -Intact to light touch, pinprick BUE (C5-T1) and BLE (L2-S1).     Coordination:  -Does not follow commands consistently for examiner to test.     Gait  -Not observed due to safety and not having a gait belt or assistive device present.       Results Review:    I reviewed the patient's new clinical results and findings.    Lab Results (last 24 hours)     Procedure Component Value Units Date/Time    Hemoglobin A1c [[id number]]  (Abnormal) Collected: 03/08/22 0311    Specimen: Blood Updated: 03/08/22 0448     Hemoglobin A1C 6.70 %     Narrative:      Hemoglobin A1C Ranges:    Increased Risk for Diabetes  5.7% to 6.4%  Diabetes                     >= 6.5%  Diabetic Goal                < 7.0%    Comprehensive Metabolic Panel [[id number]]  (Abnormal) Collected: 03/08/22 0311    Specimen: Blood Updated: 03/08/22 0444     Glucose 101 mg/dL      BUN 8 mg/dL      Creatinine 0.51 mg/dL      Sodium 137 mmol/L      Potassium 3.7 mmol/L      Chloride 100 mmol/L      CO2 27.0 mmol/L      Calcium 9.1 mg/dL      Total Protein 6.1 g/dL      Albumin 3.90 g/dL      ALT (SGPT) 22 U/L      AST (SGOT) 37 U/L      Alkaline Phosphatase 100 U/L      Total Bilirubin 0.8 mg/dL      Globulin 2.2 gm/dL      A/G Ratio 1.8 g/dL      BUN/Creatinine Ratio 15.7     Anion Gap 10.0 mmol/L      eGFR 113.2 mL/min/1.73      Comment: National Kidney Foundation and American Society of Nephrology (ASN) Task Force recommended calculation based on the Chronic Kidney Disease Epidemiology Collaboration (CKD-EPI) equation refit without adjustment for race.       Narrative:      GFR Normal >60  Chronic Kidney Disease <60  Kidney Failure <15      Lipid Panel [[id number]]   (Abnormal) Collected: 03/08/22 0311    Specimen: Blood Updated: 03/08/22 0444     Total Cholesterol 89 mg/dL      Triglycerides 86 mg/dL      HDL Cholesterol 36 mg/dL      LDL Cholesterol  36 mg/dL      VLDL Cholesterol 17 mg/dL      LDL/HDL Ratio 0.99    Narrative:      Cholesterol Reference Ranges  (U.S. Department of Health and Human Services ATP III Classifications)    Desirable          <200 mg/dL  Borderline High    200-239 mg/dL  High Risk          >240 mg/dL      Triglyceride Reference Ranges  (U.S. Department of Health and Human Services ATP III Classifications)    Normal           <150 mg/dL  Borderline High  150-199 mg/dL  High             200-499 mg/dL  Very High        >500 mg/dL    HDL Reference Ranges  (U.S. Department of Health and Human Services ATP III Classifications)    Low     <40 mg/dl (major risk factor for CHD)  High    >60 mg/dl ('negative' risk factor for CHD)        LDL Reference Ranges  (U.S. Department of Health and Human Services ATP III Classifications)    Optimal          <100 mg/dL  Near Optimal     100-129 mg/dL  Borderline High  130-159 mg/dL  High             160-189 mg/dL  Very High        >189 mg/dL    Magnesium [[id number]]  (Abnormal) Collected: 03/08/22 0311    Specimen: Blood Updated: 03/08/22 0444     Magnesium 1.3 mg/dL     CBC & Differential [[id number]]  (Abnormal) Collected: 03/08/22 0311    Specimen: Blood Updated: 03/08/22 0424    Narrative:      The following orders were created for panel order CBC & Differential.  Procedure                               Abnormality         Status                     ---------                               -----------         ------                     CBC Auto Differential[[id number]]        Abnormal            Final result                 Please view results for these tests on the individual orders.    CBC Auto Differential [[id number]]  (Abnormal) Collected: 03/08/22 0311    Specimen: Blood Updated: 03/08/22 0424     WBC 9.00 10*3/mm3       RBC 3.58 10*6/mm3      Hemoglobin 11.1 g/dL      Hematocrit 31.5 %      MCV 88.0 fL      MCH 31.0 pg      MCHC 35.2 g/dL      RDW 12.1 %      RDW-SD 39.2 fl      MPV 9.7 fL      Platelets 287 10*3/mm3      Neutrophil % 71.9 %      Lymphocyte % 15.7 %      Monocyte % 6.4 %      Eosinophil % 4.9 %      Basophil % 0.8 %      Immature Grans % 0.3 %      Neutrophils, Absolute 6.47 10*3/mm3      Lymphocytes, Absolute 1.41 10*3/mm3      Monocytes, Absolute 0.58 10*3/mm3      Eosinophils, Absolute 0.44 10*3/mm3      Basophils, Absolute 0.07 10*3/mm3      Immature Grans, Absolute 0.03 10*3/mm3      nRBC 0.0 /100 WBC     POC Glucose Once [[id number]]  (Abnormal) Collected: 03/08/22 0039    Specimen: Blood Updated: 03/08/22 0050     Glucose 147 mg/dL      Comment: : [id number] Jameson AdamMeter ID: [id number]       POC Glucose Once [[id number]]  (Abnormal) Collected: 03/07/22 2347    Specimen: Blood Updated: 03/08/22 0000     Glucose 57 mg/dL      Comment: : [id number] Riley LoriMeter ID: [id number]       POC Glucose Once [[id number]]  (Normal) Collected: 03/07/22 2020    Specimen: Blood Updated: 03/07/22 2030     Glucose 99 mg/dL      Comment: : [id number] Walker AdamMeter ID: [id number]       POC Glucose Once [[id number]]  (Normal) Collected: 03/07/22 1705    Specimen: Blood Updated: 03/07/22 1716     Glucose 96 mg/dL      Comment: : [id number] Ирина IsiselynMeter ID: [id number]       POC Glucose Once [[id number]]  (Normal) Collected: 03/07/22 1602    Specimen: Blood Updated: 03/07/22 1614     Glucose 104 mg/dL      Comment: : [id number] Claire WillamsMeter ID: [id number]       POC Glucose Once [[id number]]  (Abnormal) Collected: 03/07/22 1510    Specimen: Blood Updated: 03/07/22 1521     Glucose 49 mg/dL      Comment: : [id number] Claire Winston ID: [id number]       POC Glucose Once [[id number]]  (Abnormal) Collected: 03/07/22 1508    Specimen: Blood Updated: 03/07/22 1521     Glucose 46 mg/dL       Comment: : [id number] Claire WillamsMeter ID: [id number]       POC Glucose Once [[id number]]  (Abnormal) Collected: 03/07/22 1223    Specimen: Blood Updated: 03/07/22 1234     Glucose 154 mg/dL      Comment: : [id number] Claire WillamsMeter ID: [id number]       POC Glucose Once [[id number]]  (Abnormal) Collected: 03/07/22 1150    Specimen: Blood Updated: 03/07/22 1202     Glucose 67 mg/dL      Comment: : [id number] Claire WillamsMeter ID: [id number]             Imaging Results (Last 24 Hours)     Procedure Component Value Units Date/Time    CT Head Without Contrast [[id number]] Collected: 03/07/22 1354     Updated: 03/07/22 1410    Narrative:      CT HEAD WO CONTRAST- 3/7/2022 11:20 AM CST     HISTORY: f/u imaging, found to have contusion and possible bleed at  outside hospital, also ? Left lacunar infarct       DOSE LENGTH PRODUCT: 809 mGy cm. Automated exposure control was also  utilized to decrease patient radiation dose.     Technique:   Axial CT of the brain without IV contrast. Sagittal and coronal  reformations are also provided for review. Soft tissue and bone kernels  are available for interpretation.     Comparison: None.     Findings:      There is no evidence of acute large vascular territory infarct. Moderate  global cortical atrophy. Underlying chronic small vessel ischemic  change. Age indeterminant left basal ganglia lacunar infarct. There is a  chronic appearing paramedian posterior left frontal lobe cortical  infarct. No intra-axial or extra-axial hemorrhage. No visualized mass  lesion or mass effect. Ventricles are nondilated. Posterior fossa  structures are unremarkable. The scalp and calvarium are intact.  Visualized paranasal sinuses and mastoids are clear.        Impression:      Impression:    1. No definite intracranial hemorrhage on this exam.  2. Moderate global cortical atrophy.  3. Chronic small vessel ischemic change. Age-indeterminate left basal  ganglia lacunar infarct and  there is an additional chronic paramedian  posterior left frontal lobe cortical infarct.  This report was finalized on 03/07/2022 14:07 by Dr Jered Oliver, .          Assessment/Plan     Impression:  1.  Progressive dementia with visual hallucination and behavioral disturbance  2.  S/P fall with right frontal contusion and hematoma of the scalp  3.  Superimposed metabolic encephalopathy  4.  Episodes of hypoglycemia  5.  Hypertension, essential  6.  Episodes of hypotension  7.  Adamant D deficiency  8.  DNR/DNI      Plan:  1.  Given the patient's progressive cognitive impairment with behavioral disturbance and now recognized hallucinations, this would give one the suspicion of the possibility of Lewy body dementia as the dementia type.  Fortunately, this is really only confirmed post-mortem, but is in the differential.  Often times, behavioral disturbance and hallucinations are difficult to mitigate in these patients.  2.  CT of the head is reviewed and demonstrates no acute intracranial process.  3.  Continue recommend vitamin D supplementation.  4.  Doubtful that Namenda and Aricept are providing much benefit at this point in his progression of his disease.  Could potentially discontinue these if they are felt to be of little clinical benefit.  5.  Agree with transfer to the floor, however, patient will need a telesitter or in-room sitter for safety cueing given his attempts to get out of bed, remove his clothing and his disorientation.  6.  Patient's family is unable to provide 24-hour care, he will likely need to be in a skilled nursing facility with a dementia unit.  7.  Risperdal at only 0.25 nightly may not be sufficient to help with his evening behaviors.  Would titrate as needed/indicated.  8.  Little else for neurology to offer at this point.  We will begin to see him less frequently.          Cleveland Correa PA-C  03/08/22  10:05 CST      Electronically signed by Cleveland Correa PA-C at 03/08/22 7217           Consult Notes (most recent note)      Jonathan Foote APRN at 03/07/22 0942      Consult Orders    1. Inpatient Neurosurgery Consult [[id number]] ordered by Fernando Anthony DO at 03/07/22 0919          Attestation signed by Osvaldo Lakhani MD at 03/08/22 0732    I have reviewed this documentation and agree.    Diego Brizuela is a 66 y.o. male with a significant medical history of dementia with Alzheimer's requiring recent admission to psych unit in [place], hypertension, hyperlipidemia, type 2 diabetes, and tobacco abuse.  He presents with a new problem of altered mental status. Physical exam findings of arousable to verbal stimuli, oriented x1 to person only, follows simple commands with minimal prompting showing thumbs up bilaterally, moves all extremities equal and symmetric. Prior imaging from outside facility shown concern for a hemorrhagic contusion involving a single gyrus of the left frontal lobe.  Repeat imaging shows no evidence of acute intracranial blood products, mass, mass-effect, midline shift, or ventriculomegaly.     Differential Diagnosis:   Change in mental status  Early onset Alzheimer's   Dementia with behavioral problem  Fall from standing height     Recommendations:  No evidence of acute intracranial abnormalities noted on repeat CT head.  No neurosurgical intervention warranted at this time.  Neurosurgery call for further questions or concerns.                NEUROSURGERY INITIAL HOSPITAL ENCOUNTER    Assessment/Plan:   Diego Brizuela is a 66 y.o. male with a significant medical history of dementia with Alzheimer's, hypertension, hyperlipidemia, type 2 diabetes, and tobacco abuse.  He presents with a new problem of altered mental status. Physical exam findings of arousable to verbal stimuli, oriented x1 to person only, follows simple commands with minimal prompting showing thumbs up bilaterally, moves all extremities equal and symmetric. Prior imaging from  outside facility shown concern for a hemorrhagic contusion involving the gyrus of the left frontal lobe.  Repeat imaging shows no evidence of acute intracranial blood products, mass, mass-effect, midline shift, or ventriculomegaly.    Differential Diagnosis:   Change in mental status  Early onset Alzheimer's   Dementia with behavioral problem  Fall from standing height    Recommendations:  No evidence of acute intracranial abnormalities noted on repeat CT head.  No neurosurgical intervention warranted at this time.  Neurosurgery will continue to follow as needed.    Consult at the request of Dr. Dinero.    I discussed the patients findings and my recommendations with patient and consulting provider    Thank you very much for this interesting consult.     Level of Risk: High due to:  abrupt change in neurological status  MDM: High  Mod = 32129, [id number]  ___________________________________________________________________    Reason for consult: Altered mental status    Chief Complaint: Altered mental status    HPI: Diego Brizuela is a 66 y.o. male with a significant medical history of dementia with Alzheimer's, hypertension, hyperlipidemia, and type 2 diabetes.  He presents with a new problem of altered mental status.  To note, Mr. Brizuela was recently discharged on 3/3/2022 from [facility] behavioral unit.    In review of chart documentation from [facility], Mr. Brizuela fell at his home, where he resides with his spouse at approximately 2200 on 3/6/2022.  He apparently became uncooperative and was wandering throughout their house.  Upon EMS arrival he was found unresponsive in his bathroom.  He was transported to [facility] ED and was found to have a questionable hemorrhagic contusion involving the gyrus of the left frontal lobe.  He has since been transferred to [facility] ED for further evaluation and care.    Review of Systems   Unable to perform ROS: Dementia      Past  Medical History:  has a past medical history of Alzheimer's dementia (HCC), Diabetes mellitus (HCC), Elevated cholesterol, Hypertension, and Nephrolithiasis.    Past Surgical History:  has a past surgical history that includes Ureteral stent placement.    Family History: family history is not on file.    Social History:  reports that he has been smoking cigarettes. He has been smoking about 1.00 pack per day. He does not have any smokeless tobacco history on file. He reports previous alcohol use. He reports previous drug use.    Allergies: Patient has no known allergies.    Home Medications:   Current Facility-Administered Medications:   •  atorvastatin (LIPITOR) tablet 40 mg, 40 mg, Oral, Nightly, Fernando Anthony,   •  cholecalciferol (VITAMIN D3) tablet 5,000 Units, 5,000 Units, Oral, Daily, Jyoti Sotelo MD  •  dextrose (D50W) (25 g/50 mL) IV injection 25 g, 25 g, Intravenous, Q15 Min PRN, Fernando Anthony DO, 25 g at 03/07/22 1156  •  dextrose (GLUTOSE) oral gel 15 g, 15 g, Oral, Q15 Min PRN, Fernando Anthony,   •  [START ON 3/8/2022] donepezil (ARICEPT) tablet 10 mg, 10 mg, Oral, Daily, Fernando Anthony DO  •  glucagon (human recombinant) (GLUCAGEN DIAGNOSTIC) injection 1 mg, 1 mg, Intramuscular, Q15 Min PRN, Fernando Anthony, DO  •  insulin regular (humuLIN R,novoLIN R) injection 2-7 Units, 2-7 Units, Subcutaneous, Q6H, Fernando Anthony,   •  memantine (NAMENDA) tablet 10 mg, 10 mg, Oral, Q12H, Fernando Anthony,   •  risperiDONE (risperDAL) tablet 0.25 mg, 0.25 mg, Oral, Nightly, Fernando Anthony DO  •  [START ON 3/8/2022] sertraline (ZOLOFT) tablet 50 mg, 50 mg, Oral, Daily, Fernando Anthony, DO  •  sodium chloride 0.9 % flush 10 mL, 10 mL, Intravenous, Q12H, Fernando Anthony DO, 10 mL at 03/07/22 1157  •  sodium chloride 0.9 % flush 10 mL, 10 mL, Intravenous, PRN, Fernando Anthony, DO  •  sodium chloride 0.9 % infusion, 75 mL/hr, Intravenous, Continuous, Fernando Anthony, DO, Last  Rate: 75 mL/hr at 03/07/22 0956, 75 mL/hr at 03/07/22 0956    Medications: Scheduled Meds:atorvastatin, 40 mg, Oral, Nightly  cholecalciferol, 5,000 Units, Oral, Daily  [START ON 3/8/2022] donepezil, 10 mg, Oral, Daily  insulin regular, 2-7 Units, Subcutaneous, Q6H  memantine, 10 mg, Oral, Q12H  risperiDONE, 0.25 mg, Oral, Nightly  [START ON 3/8/2022] sertraline, 50 mg, Oral, Daily  sodium chloride, 10 mL, Intravenous, Q12H      Continuous Infusions:sodium chloride, 75 mL/hr, Last Rate: 75 mL/hr (03/07/22 0956)      PRN Meds:.dextrose  •  dextrose  •  glucagon (human recombinant)  •  sodium chloride    Vital Signs  Temp:  [96.5 °F (35.8 °C)-96.8 °F (36 °C)] 96.8 °F (36 °C)  Heart Rate:  [] 92  Resp:  [16-21] 20  BP: (115-170)/() 115/92    Physical Exam  Physical Exam  Vitals and nursing note reviewed.   Constitutional:       General: He is not in acute distress.     Appearance: Normal appearance. He is well-developed, well-groomed and overweight. He is not ill-appearing, toxic-appearing or diaphoretic.   HENT:      Head: Normocephalic and atraumatic.        Right Ear: Hearing normal.      Left Ear: Hearing normal.   Eyes:      Conjunctiva/sclera: Conjunctivae normal.      Pupils: Pupils are equal, round, and reactive to light.   Neck:      Trachea: Trachea normal.   Cardiovascular:      Rate and Rhythm: Normal rate and regular rhythm.   Pulmonary:      Effort: Pulmonary effort is normal. No tachypnea, bradypnea, accessory muscle usage or respiratory distress.   Abdominal:      Palpations: Abdomen is soft.   Musculoskeletal:      Cervical back: Full passive range of motion without pain and neck supple.   Skin:     General: Skin is warm and dry.   Neurological:      Mental Status: He is alert.      GCS: GCS eye subscore is 4. GCS verbal subscore is 5. GCS motor subscore is 6.      Deep Tendon Reflexes:      Reflex Scores:       Tricep reflexes are 2+ on the right side and 2+ on the left side.       Bicep  reflexes are 2+ on the right side and 2+ on the left side.       Brachioradialis reflexes are 2+ on the right side and 2+ on the left side.       Patellar reflexes are 2+ on the right side and 2+ on the left side.       Achilles reflexes are 2+ on the right side and 2+ on the left side.  Psychiatric:         Behavior: Behavior normal. Behavior is cooperative.         Neurologic Exam     Mental Status   Oriented to person.   Level of consciousness: arousable by verbal stimuli    Arousable to verbal stimuli.  Oriented x1 to person only.  Follows simple commands with minimal prompting showing thumbs up bilaterally.     Cranial Nerves   Cranial nerves II through XII intact.     CN III, IV, VI   Pupils are equal, round, and reactive to light.    Motor Exam   Moves all extremities equal and symmetric     Gait, Coordination, and Reflexes     Reflexes   Right brachioradialis: 2+  Left brachioradialis: 2+  Right biceps: 2+  Left biceps: 2+  Right triceps: 2+  Left triceps: 2+  Right patellar: 2+  Left patellar: 2+  Right achilles: 2+  Left achilles: 2+  Right plantar: normal  Left plantar: normal  Right Santizo: absent  Left Santizo: absent  Right ankle clonus: absent  Left ankle clonus: absent    Results Review:   Independent review and interpretation of imaging  Imaging Results (Last 24 Hours)     Procedure Component Value Units Date/Time    CT Head Without Contrast [[id number]] Collected: 03/07/22 1354     Updated: 03/07/22 1410    Narrative:      CT HEAD WO CONTRAST- 3/7/2022 11:20 AM CST     HISTORY: f/u imaging, found to have contusion and possible bleed at  outside hospital, also ? Left lacunar infarct       DOSE LENGTH PRODUCT: 809 mGy cm. Automated exposure control was also  utilized to decrease patient radiation dose.     Technique:   Axial CT of the brain without IV contrast. Sagittal and coronal  reformations are also provided for review. Soft tissue and bone kernels  are available for interpretation.      Comparison: None.     Findings:      There is no evidence of acute large vascular territory infarct. Moderate  global cortical atrophy. Underlying chronic small vessel ischemic  change. Age indeterminant left basal ganglia lacunar infarct. There is a  chronic appearing paramedian posterior left frontal lobe cortical  infarct. No intra-axial or extra-axial hemorrhage. No visualized mass  lesion or mass effect. Ventricles are nondilated. Posterior fossa  structures are unremarkable. The scalp and calvarium are intact.  Visualized paranasal sinuses and mastoids are clear.        Impression:      Impression:    1. No definite intracranial hemorrhage on this exam.  2. Moderate global cortical atrophy.  3. Chronic small vessel ischemic change. Age-indeterminate left basal  ganglia lacunar infarct and there is an additional chronic paramedian  posterior left frontal lobe cortical infarct.  This report was finalized on 03/07/2022 14:07 by Dr Jered Oliver, .        MRI brain:  MRI spine:   CT Head:  3/7/2022. [facility]      3/7/2022    CT c-spine:  CT t-spine:  CT l-spine:  X-ray:    I reviewed the patient's new clinical results.  Lab Results (last 24 hours)     Procedure Component Value Units Date/Time    POC Glucose Once [[id number]]  (Abnormal) Collected: 03/07/22 1223    Specimen: Blood Updated: 03/07/22 1234     Glucose 154 mg/dL      Comment: : [id number] [place] [employer] ID: [id number]       POC Glucose Once [[id number]]  (Abnormal) Collected: 03/07/22 1150    Specimen: Blood Updated: 03/07/22 1202     Glucose 67 mg/dL      Comment: : [id number] [place] [employer] ServicescyMeter ID: [id number]       POC Glucose Once [[id number]]  (Abnormal) Collected: 03/07/22 0935    Specimen: Blood Updated: 03/07/22 0947     Glucose 154 mg/dL      Comment: : [id number] [place] NancyMeter ID: [id number]       POC Glucose Once [[id number]]  (Abnormal) Collected: 03/07/22 0915    Specimen: Blood Updated: 03/07/22 0926     Glucose 44 mg/dL       Comment: : [id number] Claire WillamsMeter ID: [id number]       POC Glucose Once [[id number]]  (Abnormal) Collected: 03/07/22 0913    Specimen: Blood Updated: 03/07/22 0926     Glucose 54 mg/dL      Comment: : [id number] Claire WillamsMeter ID: [id number]           HARINI Taylor          Electronically signed by Osvaldo Lakhani MD at 03/08/22 0702

## 2022-03-08 NOTE — PAYOR COMM NOTE
"Diego Brizuela (66 y.o. Male) [id number]    Admit 3/7   [facility] phone     Fax                Date of Birth   1955    Social Security Number       Address   [address] [facility] [address]    Home Phone   [phone]    MRN   [id number]       Episcopal   Other    Marital Status                               Admission Date   3/7/22    Admission Type   Urgent    Admitting Provider   Fernando Anthony DO    Attending Provider   Fernando Anthony DO    Department, Room/Bed   [facility], C010/1       Discharge Date       Discharge Disposition       Discharge Destination                               Attending Provider: Fernando Anthony DO    Allergies: No Known Allergies    Isolation: None   Infection: None   Code Status: No CPR   Advance Care Planning Activity    Ht: 170.2 cm (67.01\")   Wt: 77.2 kg (170 lb 3.1 oz)    Admission Cmt: None   Principal Problem: Syncope [R55]                 Active Insurance as of 3/7/2022     Primary Coverage     Payor Plan Insurance Group Employer/Plan Group    HUMANA MEDICARE REPLACEMENT HUMANA MEDICARE REPLACEMENT [id number]     Payor Plan Address Payor Plan Phone Number Payor Plan Fax Number Effective Dates    [address] [address] [phone]  1/1/2022 - None Entered    [facility] [id number]       Subscriber Name Subscriber Birth Date Member ID       DIEGO BRIZUELA 1955 [id number]                 Emergency Contacts      (Rel.) Home Phone Work Phone Mobile Phone    Maxine Brizuela (Spouse) [phone] -- [phone]               History & Physical      Fernando Anthony DO at 03/07/22 0907              [facility] Medicine Services  HISTORY AND PHYSICAL    Date of Admission: 3/7/2022  Primary Care Physician: Margaux Fisher APRN    Subjective     Chief Complaint: brain contusions/cva    History of Present " Illness  Patient is a 66-year-old male with a history of early onset dementia, hypertension, hyperlipidemia, diabetes.  He apparently was just recently hospitalized at [facility] with psychiatry for behavioral disturbances.  He was admitted on 2/26 and discharged on 3/3.  Apparently last night he was found unresponsive in his bathroom with a laceration over his eye.  He was brought to [place] ER where head CT showed a brain contusion with a possible hemorrhagic component as well as a possible small lacunar stroke.  His labs otherwise were okay with normal renal function and electrolytes.  CBC slightly elevated at 13.  He reportedly was hypotensive in the ER there but has been normotensive to hypertensive since arrival to our ICU.  Was significantly confused on arrival unable to answer questions.  I did call his wife who told me that he has a living will which is a DNR/DNI.  She says he is been acting off since he was sent to [facility] and discharged last week.  This essentially is how he has been as far as confusion since that hospital stay but overall that is a decline from prior.  He has not had any other complaints to her.      Review of Systems   Unable to assess due to patient factors    Past Medical History:  Dementia, htn, diabetes  Past Surgical History:No past surgical history on file.  Social History:  reports that he has been smoking cigarettes. He has been smoking about 1.00 pack per day. He does not have any smokeless tobacco history on file. He reports previous alcohol use.    Family History: htn    Allergies:  No Known Allergies    Medications:  Prior to Admission medications    Medication Sig Start Date End Date Taking? Authorizing Provider   aspirin 81 MG chewable tablet Chew 81 mg Daily.    Provider, MD Jacob   atorvastatin (LIPITOR) 40 MG tablet Take 1 tablet by mouth Daily. Indications: Cerebrovascular Accident or Stroke, High Amount of Fats in the Blood 3/4/22   Yoan Floyd  Gunnar PANDA MD   Canagliflozin (Invokana) 300 MG tablet tablet Take 300 mg by mouth Daily.    ProviderJacob MD   cholecalciferol (VITAMIN D3) 1.25 MG (72695 UT) capsule Take 1 capsule by mouth Every 7 (Seven) Days. Indications: Vitamin D Deficiency 3/6/22   Yoan Floyd II, MD   clopidogrel (PLAVIX) 75 MG tablet Take 1 tablet by mouth Daily. Indications: Cerebrovascular Accident or Stroke 3/4/22   Yoan Floyd II, MD   donepezil (ARICEPT) 10 MG tablet Take 10 mg by mouth Daily.    Jacob Rodas MD   glipizide (GLUCOTROL) 5 MG tablet Take 5 mg by mouth 2 (Two) Times a Day Before Meals.    Jacob Rodas MD   lisinopril (PRINIVIL,ZESTRIL) 10 MG tablet Take 10 mg by mouth Daily.    Jacob Rodas MD   melatonin 1 MG tablet Take 1.5 tablets by mouth Every Night. Indications: Circadian entrainment 3/3/22   Yoan Floyd II, MD   memantine (NAMENDA) 10 MG tablet Take 10 mg by mouth 2 (Two) Times a Day.    Jacob Rodas MD   metFORMIN (GLUCOPHAGE) 1000 MG tablet Take 1,000 mg by mouth 2 (Two) Times a Day With Meals.    Jacob Rodas MD   risperiDONE (risperDAL) 0.25 MG tablet Take 1 tablet by mouth Every Night. Indications: Behavioral Disorders associated with Dementia, Visual hallucination related to cognitive disorder 3/3/22   Yoan Floyd II, MD   sertraline (ZOLOFT) 50 MG tablet Take 1 tablet by mouth Daily. Indications: Mood & Anxiety related to neurocognitive disorder 3/4/22   Yoan Floyd II, MD     I have utilized all available immediate resources to obtain, update, and review the patient's current medications.    Objective     Vital Signs: /73   Pulse 92   SpO2 100%   Physical Exam   GEN: Awake, alert, interactive but confused, in NAD  HEENT: sutured lac above R eye brow, PERRLA, Anicteric, Trachea midline  Lungs: no wheezing/rales/rhonchi  Heart: RRR, +S1/s2, no rub  ABD: soft, nt/nd, +BS, no  guarding/rebound  Extremities: atraumatic, no cyanosis, no edema  Skin: no rashes or petechiae  Neuro: AAOx1, العلي extremities, no obvious gross deficits but difficult to examine  Psych: flat affect        Results Reviewed:  Lab Results (last 24 hours)     Procedure Component Value Units Date/Time    POC Glucose Once [[id number]]  (Abnormal) Collected: 03/07/22 0915    Specimen: Blood Updated: 03/07/22 0926     Glucose 44 mg/dL      Comment: : [id number] Claire NancyMeter ID: [id number]       POC Glucose Once [[id number]]  (Abnormal) Collected: 03/07/22 0913    Specimen: Blood Updated: 03/07/22 0926     Glucose 54 mg/dL      Comment: : [id number] [place] NancyMeter ID: [id number]           Imaging Results (Last 24 Hours)     ** No results found for the last 24 hours. **        Labs and imaging from outside hospital reviewed in our chart.  Normal chemistries.  CBC with slight white count elevation at 13.  UA without overt signs of infection.  UDS positive for benzos.  Head CT with Jessa note of 1.3 cm contusion and possible bleed as well as new lacunar infarct.    I have personally reviewed and interpreted the radiology studies and ECG obtained at time of admission.     Assessment / Plan     Assessment:   Active Hospital Problems    Diagnosis    • **CVA (cerebral vascular accident) (HCC)    • Brain contusion (HCC)    • Type 2 diabetes mellitus without complication, with long-term current use of insulin (HCC)    • HTN (hypertension)    • Dementia with behavioral problem (HCC)    • Hyperlipemia      Plan:   #1 CVA -patient reportedly has been off since the end of February for mental standpoint.  He had a head CT on 2/25 prior to admission to [place] which was nonacute.  Head CT at outside facility yesterday after fall shows a possible lacunar infarct that was not there on 2/25 per report.  We will plan for a stroke work-up.  We will have neurology see the patient.  Will repeat head CT and for now to compare  given contusion.  Patient currently to active to stay flat for MRI.  I have to reassess.  Check a 2D echo and NICS.  For now continue with statin as prior.  We will hold home aspirin and Plavix given potential brain bleed until follow-up imaging done and seen by neurosurgery.     #2  Brain contusion -1.3 cm with possible hemorrhagic component per outside report.  We will have neurosurgery eval.  As above we will repeat head CT due to compare.    #3 DM2 -takes long-acting insulin at home.  Presented here hyperglycemic.  Treated.  Continue to monitor sugars every 6.  Currently NPO.  Hypoglycemia protocol in place.    #4 essential hypertension -reportedly hypotensive at outside facility but doing okay since arrival here.  Monitor closely.    #5 hyperlipidemia -statin    #6 early onset dementia -noted.  On Aricept and donepezil.  Was recently hospitalized for changes in his mental status but he persisted through that state after discharge.  Unclear if it is any association with potential stroke we are seeing now although unlikely as it is not a frontal stroke.  Again we will get neuro input.    Code Status/Advanced Care Plan: DNR/DNI    The patient's surrogate decision maker is his wife.     I discussed my findings and recommendations with the wife.    Estimated length of stay is 2+ days.     The patient was seen and examined by me on 3/7/22 at 9:05 AM.    Electronically signed by Fernando Anthony DO, 03/07/22, 10:09 CST.                Electronically signed by Fernando Anthony DO at 03/07/22 1010         Current Facility-Administered Medications   Medication Dose Route Frequency Provider Last Rate Last Admin   • aspirin EC tablet 81 mg  81 mg Oral Daily Fernando Anthony DO       • atorvastatin (LIPITOR) tablet 40 mg  40 mg Oral Nightly Fernando Anthony DO   40 mg at 03/07/22 2020   • cholecalciferol (VITAMIN D3) tablet 5,000 Units  5,000 Units Oral Daily Jyoti Sotelo MD   5,000 Units at 03/08/22 0908   •  dextrose (D50W) (25 g/50 mL) IV injection 25 g  25 g Intravenous Q15 Min PRN Fernando Anthony DO   25 g at 03/07/22 2354   • dextrose (GLUTOSE) oral gel 15 g  15 g Oral Q15 Min PRN Fernando Anthony DO       • donepezil (ARICEPT) tablet 10 mg  10 mg Oral Daily Fernando Anthony DO   10 mg at 03/08/22 0908   • glucagon (human recombinant) (GLUCAGEN DIAGNOSTIC) injection 1 mg  1 mg Intramuscular Q15 Min PRN Fernando Anthony DO       • insulin regular (humuLIN R,novoLIN R) injection 2-7 Units  2-7 Units Subcutaneous Q6H Fernando Anthony DO       • magnesium sulfate 2g/50 mL (PREMIX) infusion  2 g Intravenous Once Fernando Anthony DO       • memantine (NAMENDA) tablet 10 mg  10 mg Oral Q12H Fernando Anthony DO   10 mg at 03/08/22 0908   • risperiDONE (risperDAL) tablet 0.25 mg  0.25 mg Oral Nightly Fernando Anthony DO   0.25 mg at 03/07/22 2020   • sertraline (ZOLOFT) tablet 50 mg  50 mg Oral Daily Fernando Anthony DO   50 mg at 03/08/22 0908   • sodium chloride 0.9 % flush 10 mL  10 mL Intravenous Q12H Fernando Anthony DO   10 mL at 03/08/22 0908   • sodium chloride 0.9 % flush 10 mL  10 mL Intravenous PRN Fernando Anthony DO            Physician Progress Notes (last 48 hours)      Fernando Anthony DO at 03/08/22 0809              [facility] Medicine Services  INPATIENT PROGRESS NOTE    Patient Name: Diego Brizuela  Date of Admission: 3/7/2022  Today's Date: 03/08/22  Length of Stay: 1  Primary Care Physician: Margaux Fisher APRN    Subjective   Chief Complaint: Follow-up syncope/head laceration    HPI   Patient initially transferred to us from outside facility for CT evidence of contusion/brain bleed with stroke.  On arrival patient was significantly confused and combative.  Had to restrain.  He has done better overnight although he is still very impulsive.  He is now awake and interactive.  He is more appropriate but still significantly confused.  Oriented only to  self.  No fevers overnight.  Did require some D50 and D5 fluids yesterday for hypoglycemia.  No other acute issues overnight.  Now tolerating p.o.  Repeat CT head yesterday morning arrival showed no brain bleed, contusion, or stroke.      Review of Systems   Unable to assess due to patient factors/non-reliable    Objective    Temp:  [96.5 °F (35.8 °C)-97.8 °F (36.6 °C)] 97.6 °F (36.4 °C)  Heart Rate:  [] 73  Resp:  [16-21] 18  BP: (109-170)/() 109/73  Physical Exam  GEN: Awake, alert, interactive but confused, in NAD  HEENT: sutured lac above R eye brow, PERRLA, Anicteric, Trachea midline  Lungs: no wheezing/rales/rhonchi  Heart: RRR, +S1/s2, no rub  ABD: soft, nt/nd, +BS, no guarding/rebound  Extremities: atraumatic, no cyanosis, no edema  Skin: no rashes or petechiae  Neuro: AAOx1, العلي extremities, no obvious deficits  Psych: flat affect      Results Review:  I have reviewed the labs, radiology results, and diagnostic studies.    Laboratory Data:   Results from last 7 days   Lab Units 03/08/22  0311   WBC 10*3/mm3 9.00   HEMOGLOBIN g/dL 11.1*   HEMATOCRIT % 31.5*   PLATELETS 10*3/mm3 287        Results from last 7 days   Lab Units 03/08/22  0311   SODIUM mmol/L 137   POTASSIUM mmol/L 3.7   CHLORIDE mmol/L 100   CO2 mmol/L 27.0   BUN mg/dL 8   CREATININE mg/dL 0.51*   CALCIUM mg/dL 9.1   BILIRUBIN mg/dL 0.8   ALK PHOS U/L 100   ALT (SGPT) U/L 22   AST (SGOT) U/L 37   GLUCOSE mg/dL 101*       Culture Data:   No results found for: BLOODCX, URINECX, WOUNDCX, MRSACX, RESPCX, STOOLCX    Radiology Data:   Imaging Results (Last 24 Hours)     Procedure Component Value Units Date/Time    CT Head Without Contrast [[id number]] Collected: 03/07/22 1354     Updated: 03/07/22 1410    Narrative:      CT HEAD WO CONTRAST- 3/7/2022 11:20 AM CST     HISTORY: f/u imaging, found to have contusion and possible bleed at  outside hospital, also ? Left lacunar infarct       DOSE LENGTH PRODUCT: 809 mGy cm. Automated exposure  control was also  utilized to decrease patient radiation dose.     Technique:   Axial CT of the brain without IV contrast. Sagittal and coronal  reformations are also provided for review. Soft tissue and bone kernels  are available for interpretation.     Comparison: None.     Findings:      There is no evidence of acute large vascular territory infarct. Moderate  global cortical atrophy. Underlying chronic small vessel ischemic  change. Age indeterminant left basal ganglia lacunar infarct. There is a  chronic appearing paramedian posterior left frontal lobe cortical  infarct. No intra-axial or extra-axial hemorrhage. No visualized mass  lesion or mass effect. Ventricles are nondilated. Posterior fossa  structures are unremarkable. The scalp and calvarium are intact.  Visualized paranasal sinuses and mastoids are clear.        Impression:      Impression:    1. No definite intracranial hemorrhage on this exam.  2. Moderate global cortical atrophy.  3. Chronic small vessel ischemic change. Age-indeterminate left basal  ganglia lacunar infarct and there is an additional chronic paramedian  posterior left frontal lobe cortical infarct.  This report was finalized on 03/07/2022 14:07 by Dr Jered Oliver, .          I have reviewed the patient's current medications.     Assessment/Plan     Active Hospital Problems    Diagnosis    • **Syncope    • Laceration of head    • Fall from standing    • Brain contusion (HCC)    • Type 2 diabetes mellitus without complication, with long-term current use of insulin (HCC)    • HTN (hypertension)    • Dementia with behavioral problem (HCC)    • Hyperlipemia        Plan:  #1 syncope/fall for standing -patient with reported syncope but essentially was found down at home after a fall from standing height with a head laceration.  No witnessed syncope reported.  He was transferred here from outside facility with concern for brain contusion and bleed as well as a stroke.  Our imaging does  not definitively show any of those things.  Patient has been seen by neurology and neurosurgery.  At this point suspect he just probably fell in the middle of night at home and hit his head. He is working with speech and passed for p.o. intake.  PT and OT to see today.    #2 DM 2 -patient was reported to be on long-acting insulin at home.  He was hypoglycemic on arrival here.  Also could been potential for cause of his fall at home on top of his dementia.  We have been holding long-acting and doing sliding scale but he has not been needing it.  He is asked required D50 several times.  He was on D5 fluids yesterday and throughout the night.  He is advancing diet.  We will hold fluids today and monitor for any further hypoglycemia.  At discharge would likely only use Metformin and discontinue his glipizide.  A1c 6.7.    #3 head laceration -sutured without issue.  Will need to follow-up with provider for suture removal when appropriate.    #4 essential hypertension -BP has been stable so far without his home lisinopril.  Monitor.    #5 hyperlipidemia -on statin    #6 early onset dementia with behavioral issues -noted.  He is on Aricept, donepezil, multiple other meds.  He was recently hospitalized at [place] with [facility] for issues and meds were adjusted.  He was sent home essentially the condition he was brought there with per wife.  Has not really improved.  This could be progression of his dementia.  He has been dealing with this since around 2010 I believe.  Could be rapidly progressive.  Does have a frontal lobe lesion on head CT at all that also could have affected this.  Regardless not sure there is much else to do at this time for the patient.      Discharge Planning: We will transfer the patient out of the ICU today with a sitter.  We will try to get up and ambulating.  We will monitor off D5 fluids.  He does well tomorrow without any setbacks can probably go home as prior.  Tried to call wife  discussed with her but no answer.      Electronically signed by Fernando Anthony DO, 03/08/22, 08:09 CST.      Electronically signed by Fernando Anthony DO at 03/08/22 1005          Consult Notes (last 72 hours)      Jonathan Foote APRN at 03/07/22 0942      Consult Orders    1. Inpatient Neurosurgery Consult [[id number]] ordered by Fernando Anthony DO at 03/07/22 0919          Attestation signed by Osvaldo Lakhani MD at 03/08/22 0732    I have reviewed this documentation and agree.    Diego Brizuela is a 66 y.o. male with a significant medical history of dementia with Alzheimer's requiring recent admission to psych unit in [place], hypertension, hyperlipidemia, type 2 diabetes, and tobacco abuse.  He presents with a new problem of altered mental status. Physical exam findings of arousable to verbal stimuli, oriented x1 to person only, follows simple commands with minimal prompting showing thumbs up bilaterally, moves all extremities equal and symmetric. Prior imaging from outside facility shown concern for a hemorrhagic contusion involving a single gyrus of the left frontal lobe.  Repeat imaging shows no evidence of acute intracranial blood products, mass, mass-effect, midline shift, or ventriculomegaly.     Differential Diagnosis:   Change in mental status  Early onset Alzheimer's   Dementia with behavioral problem  Fall from standing height     Recommendations:  No evidence of acute intracranial abnormalities noted on repeat CT head.  No neurosurgical intervention warranted at this time.  Neurosurgery call for further questions or concerns.                NEUROSURGERY INITIAL HOSPITAL ENCOUNTER    Assessment/Plan:   Diego Brizuela is a 66 y.o. male with a significant medical history of dementia with Alzheimer's, hypertension, hyperlipidemia, type 2 diabetes, and tobacco abuse.  He presents with a new problem of altered mental status. Physical exam findings of arousable to verbal  stimuli, oriented x1 to person only, follows simple commands with minimal prompting showing thumbs up bilaterally, moves all extremities equal and symmetric. Prior imaging from outside facility shown concern for a hemorrhagic contusion involving the gyrus of the left frontal lobe.  Repeat imaging shows no evidence of acute intracranial blood products, mass, mass-effect, midline shift, or ventriculomegaly.    Differential Diagnosis:   Change in mental status  Early onset Alzheimer's   Dementia with behavioral problem  Fall from standing height    Recommendations:  No evidence of acute intracranial abnormalities noted on repeat CT head.  No neurosurgical intervention warranted at this time.  Neurosurgery will continue to follow as needed.    Consult at the request of Dr. Dinero.    I discussed the patients findings and my recommendations with patient and consulting provider    Thank you very much for this interesting consult.     Level of Risk: High due to:  abrupt change in neurological status  MDM: High  Mod = 03897, Fayg=[id number]  ___________________________________________________________________    Reason for consult: Altered mental status    Chief Complaint: Altered mental status    HPI: Diego Brizuela is a 66 y.o. male with a significant medical history of dementia with Alzheimer's, hypertension, hyperlipidemia, and type 2 diabetes.  He presents with a new problem of altered mental status.  To note, Mr. Brizuela was recently discharged on 3/3/2022 from [facility] behavioral unit.    In review of chart documentation from [facility], Mr. Brizuela fell at his home, where he resides with his spouse at approximately 2200 on 3/6/2022.  He apparently became uncooperative and was wandering throughout their house.  Upon EMS arrival he was found unresponsive in his bathroom.  He was transported to [facility] ED and was found to have a questionable hemorrhagic contusion involving the gyrus of the left  frontal lobe.  He has since been transferred to [facility] ED for further evaluation and care.    Review of Systems   Unable to perform ROS: Dementia      Past Medical History:  has a past medical history of Alzheimer's dementia (HCC), Diabetes mellitus (HCC), Elevated cholesterol, Hypertension, and Nephrolithiasis.    Past Surgical History:  has a past surgical history that includes Ureteral stent placement.    Family History: family history is not on file.    Social History:  reports that he has been smoking cigarettes. He has been smoking about 1.00 pack per day. He does not have any smokeless tobacco history on file. He reports previous alcohol use. He reports previous drug use.    Allergies: Patient has no known allergies.    Home Medications:   Current Facility-Administered Medications:   •  atorvastatin (LIPITOR) tablet 40 mg, 40 mg, Oral, Nightly, Fernando Anthony,   •  cholecalciferol (VITAMIN D3) tablet 5,000 Units, 5,000 Units, Oral, Daily, Jyoti Sotelo MD  •  dextrose (D50W) (25 g/50 mL) IV injection 25 g, 25 g, Intravenous, Q15 Min PRN, Fernando Anthony, , 25 g at 03/07/22 1156  •  dextrose (GLUTOSE) oral gel 15 g, 15 g, Oral, Q15 Min PRN, Fernando Anthony, DO  •  [START ON 3/8/2022] donepezil (ARICEPT) tablet 10 mg, 10 mg, Oral, Daily, Fernando Anthoyn, DO  •  glucagon (human recombinant) (GLUCAGEN DIAGNOSTIC) injection 1 mg, 1 mg, Intramuscular, Q15 Min PRN, Fernando Anthony, DO  •  insulin regular (humuLIN R,novoLIN R) injection 2-7 Units, 2-7 Units, Subcutaneous, Q6H, Fernando Anthony, DO  •  memantine (NAMENDA) tablet 10 mg, 10 mg, Oral, Q12H, Fernando Anthony, DO  •  risperiDONE (risperDAL) tablet 0.25 mg, 0.25 mg, Oral, Nightly, Fernando Anthony, DO  •  [START ON 3/8/2022] sertraline (ZOLOFT) tablet 50 mg, 50 mg, Oral, Daily, Fernando Anthony, DO  •  sodium chloride 0.9 % flush 10 mL, 10 mL, Intravenous, Q12H, Fernando Anthony, DO, 10 mL at 03/07/22 1157  •  sodium  chloride 0.9 % flush 10 mL, 10 mL, Intravenous, PRN, Fernando Anthony DO  •  sodium chloride 0.9 % infusion, 75 mL/hr, Intravenous, Continuous, Fernando Anthony DO, Last Rate: 75 mL/hr at 03/07/22 0956, 75 mL/hr at 03/07/22 0956    Medications: Scheduled Meds:atorvastatin, 40 mg, Oral, Nightly  cholecalciferol, 5,000 Units, Oral, Daily  [START ON 3/8/2022] donepezil, 10 mg, Oral, Daily  insulin regular, 2-7 Units, Subcutaneous, Q6H  memantine, 10 mg, Oral, Q12H  risperiDONE, 0.25 mg, Oral, Nightly  [START ON 3/8/2022] sertraline, 50 mg, Oral, Daily  sodium chloride, 10 mL, Intravenous, Q12H      Continuous Infusions:sodium chloride, 75 mL/hr, Last Rate: 75 mL/hr (03/07/22 0956)      PRN Meds:.dextrose  •  dextrose  •  glucagon (human recombinant)  •  sodium chloride    Vital Signs  Temp:  [96.5 °F (35.8 °C)-96.8 °F (36 °C)] 96.8 °F (36 °C)  Heart Rate:  [] 92  Resp:  [16-21] 22  BP: (115-170)/() 115/92    Physical Exam  Physical Exam  Vitals and nursing note reviewed.   Constitutional:       General: He is not in acute distress.     Appearance: Normal appearance. He is well-developed, well-groomed and overweight. He is not ill-appearing, toxic-appearing or diaphoretic.   HENT:      Head: Normocephalic and atraumatic.        Right Ear: Hearing normal.      Left Ear: Hearing normal.   Eyes:      Conjunctiva/sclera: Conjunctivae normal.      Pupils: Pupils are equal, round, and reactive to light.   Neck:      Trachea: Trachea normal.   Cardiovascular:      Rate and Rhythm: Normal rate and regular rhythm.   Pulmonary:      Effort: Pulmonary effort is normal. No tachypnea, bradypnea, accessory muscle usage or respiratory distress.   Abdominal:      Palpations: Abdomen is soft.   Musculoskeletal:      Cervical back: Full passive range of motion without pain and neck supple.   Skin:     General: Skin is warm and dry.   Neurological:      Mental Status: He is alert.      GCS: GCS eye subscore is 4. GCS verbal  subscore is 5. GCS motor subscore is 6.      Deep Tendon Reflexes:      Reflex Scores:       Tricep reflexes are 2+ on the right side and 2+ on the left side.       Bicep reflexes are 2+ on the right side and 2+ on the left side.       Brachioradialis reflexes are 2+ on the right side and 2+ on the left side.       Patellar reflexes are 2+ on the right side and 2+ on the left side.       Achilles reflexes are 2+ on the right side and 2+ on the left side.  Psychiatric:         Behavior: Behavior normal. Behavior is cooperative.         Neurologic Exam     Mental Status   Oriented to person.   Level of consciousness: arousable by verbal stimuli    Arousable to verbal stimuli.  Oriented x1 to person only.  Follows simple commands with minimal prompting showing thumbs up bilaterally.     Cranial Nerves   Cranial nerves II through XII intact.     CN III, IV, VI   Pupils are equal, round, and reactive to light.    Motor Exam   Moves all extremities equal and symmetric     Gait, Coordination, and Reflexes     Reflexes   Right brachioradialis: 2+  Left brachioradialis: 2+  Right biceps: 2+  Left biceps: 2+  Right triceps: 2+  Left triceps: 2+  Right patellar: 2+  Left patellar: 2+  Right achilles: 2+  Left achilles: 2+  Right plantar: normal  Left plantar: normal  Right Santizo: absent  Left Santizo: absent  Right ankle clonus: absent  Left ankle clonus: absent    Results Review:   Independent review and interpretation of imaging  Imaging Results (Last 24 Hours)     Procedure Component Value Units Date/Time    CT Head Without Contrast [[id number]] Collected: 03/07/22 1354     Updated: 03/07/22 1410    Narrative:      CT HEAD WO CONTRAST- 3/7/2022 11:20 AM CST     HISTORY: f/u imaging, found to have contusion and possible bleed at  outside hospital, also ? Left lacunar infarct       DOSE LENGTH PRODUCT: 809 mGy cm. Automated exposure control was also  utilized to decrease patient radiation dose.     Technique:   Axial CT of  the brain without IV contrast. Sagittal and coronal  reformations are also provided for review. Soft tissue and bone kernels  are available for interpretation.     Comparison: None.     Findings:      There is no evidence of acute large vascular territory infarct. Moderate  global cortical atrophy. Underlying chronic small vessel ischemic  change. Age indeterminant left basal ganglia lacunar infarct. There is a  chronic appearing paramedian posterior left frontal lobe cortical  infarct. No intra-axial or extra-axial hemorrhage. No visualized mass  lesion or mass effect. Ventricles are nondilated. Posterior fossa  structures are unremarkable. The scalp and calvarium are intact.  Visualized paranasal sinuses and mastoids are clear.        Impression:      Impression:    1. No definite intracranial hemorrhage on this exam.  2. Moderate global cortical atrophy.  3. Chronic small vessel ischemic change. Age-indeterminate left basal  ganglia lacunar infarct and there is an additional chronic paramedian  posterior left frontal lobe cortical infarct.  This report was finalized on 03/07/2022 14:07 by Dr Jered Oliver, .        MRI brain:  MRI spine:   CT Head:  3/7/2022. [facility]      3/7/2022    CT c-spine:  CT t-spine:  CT l-spine:  X-ray:    I reviewed the patient's new clinical results.  Lab Results (last 24 hours)     Procedure Component Value Units Date/Time    POC Glucose Once [[id number]]  (Abnormal) Collected: 03/07/22 1223    Specimen: Blood Updated: 03/07/22 1234     Glucose 154 mg/dL      Comment: : [id number] Claire WillamsMeter ID: [id number]       POC Glucose Once [[id number]]  (Abnormal) Collected: 03/07/22 1150    Specimen: Blood Updated: 03/07/22 1202     Glucose 67 mg/dL      Comment: : [id number] Claire WillamsMetjacob ID: [id number]       POC Glucose Once [[id number]]  (Abnormal) Collected: 03/07/22 0935    Specimen: Blood Updated: 03/07/22 0947     Glucose 154 mg/dL      Comment: : [id number] Claire  ЕкатеринаMeter ID: [id number]       POC Glucose Once [[id number]]  (Abnormal) Collected: 03/07/22 0915    Specimen: Blood Updated: 03/07/22 0926     Glucose 44 mg/dL      Comment: : [id number] Claire WillamsMeter ID: [id number]       POC Glucose Once [[id number]]  (Abnormal) Collected: 03/07/22 0913    Specimen: Blood Updated: 03/07/22 0926     Glucose 54 mg/dL      Comment: : [id number] Claire WillamsMeter ID: [id number]           HARINI Taylor          Electronically signed by Osvaldo Lakhani MD at 03/08/22 0732     Jyoti Sotelo MD at 03/07/22 0927      Consult Orders    1. Inpatient Neurology Consult Stroke [[id number]] ordered by Fernando Anthony DO at 03/07/22 0923                   Neurology Consult Note    Patient:  Diego Brizuela   YOB: 1955  MRN:  [id number]  Date of Admission:  3/7/2022  8:58 AM    Date: 3/7/2022    Referring Provider: Fernando Anthony DO  Reason for Consultation: Stroke      History of present illness:     This is a 66 y.o.  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  with known diagnosis of hypertension, hyperlipidemia, DM2, sleep disorder, and dementia with behavior issues and hallucinations evaluated for stroke  Labs obtained on 3/1/22:  NH3 of 20  RPR is NR  HIV 1 and 2 are NR  On 2/26 his TSH was normal at 1.7 and B12 was high at 1,119  Vitamin D 25 OH was deficient at 12.7  Patient was found by his wife last night unresponsive in the bathroom and with laceration that required sutures over his right eye. Head CT revealed a lesion suggestive of a hemorrhagic component (verses artifact) over area of contusion on left.   LDL of 101 on 2/26/22 Goal of < 70  Hemoglobin A1C of 7.5 on 2/27/22 with goal of < 7.0    Patient is awake and alert and talking but not answering the questions asked and to following commands well although he may do partial following of a command..     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 But shows hypodensity of left   Past medical history   Hypertension  Hyperlipidemia  Alzheimer's  Sleep Disorder  DM2    Past Surgical History:   Procedure Laterality Date   • URETERAL STENT INSERTION         Prior to Admission medications    Medication Sig Start Date End Date Taking? Authorizing Provider   aspirin 81 MG chewable tablet Chew 81 mg Daily.    Jacob Rodas MD   atorvastatin (LIPITOR) 40 MG tablet Take 1 tablet by mouth Daily. Indications: Cerebrovascular Accident or Stroke, High Amount of Fats in the Blood 3/4/22   Yoan Floyd II, MD   Canagliflozin (Invokana) 300 MG tablet tablet Take 300 mg by mouth Daily.    Jacob Rodas MD   cholecalciferol (VITAMIN D3) 1.25 MG (22750 UT) capsule Take 1 capsule by mouth Every 7 (Seven) Days. Indications: Vitamin D Deficiency 3/6/22   Yoan Floyd II, MD   clopidogrel (PLAVIX) 75 MG tablet Take 1 tablet by mouth Daily. Indications: Cerebrovascular Accident or Stroke 3/4/22   Yoan Floyd II, MD   donepezil (ARICEPT) 10 MG tablet Take 10 mg by mouth Daily.    Jacob Rodas MD   glipizide (GLUCOTROL) 5 MG tablet Take 5 mg by mouth 2 (Two) Times a Day Before Meals.    Jacob Rodas MD   lisinopril (PRINIVIL,ZESTRIL) 10 MG tablet Take 10 mg by mouth Daily.    Jacob Rodas MD   melatonin 1 MG tablet Take 1.5 tablets by mouth Every Night. Indications: Circadian entrainment 3/3/22   Yoan Floyd II, MD   memantine (NAMENDA) 10 MG tablet Take 10 mg by mouth 2 (Two) Times a Day.    Jacob Rodas MD   metFORMIN (GLUCOPHAGE) 1000 MG tablet Take 1,000 mg by mouth 2 (Two) Times a Day With Meals.    Jacob Rodas MD   risperiDONE  (risperDAL) 0.25 MG tablet Take 1 tablet by mouth Every Night. Indications: Behavioral Disorders associated with Dementia, Visual hallucination related to cognitive disorder 3/3/22   Yoan Floyd II, MD   sertraline (ZOLOFT) 50 MG tablet Take 1 tablet by mouth Daily. Indications: Mood & Anxiety related to neurocognitive disorder 3/4/22   Yoan Floyd II, MD       Hospital scheduled medications:   atorvastatin, 40 mg, Oral, Nightly  [START ON 3/8/2022] donepezil, 10 mg, Oral, Daily  insulin regular, 2-7 Units, Subcutaneous, Q6H  memantine, 10 mg, Oral, Q12H  risperiDONE, 0.25 mg, Oral, Nightly  [START ON 3/8/2022] sertraline, 50 mg, Oral, Daily  sodium chloride, 10 mL, Intravenous, Q12H      Hospital PRN medications:  dextrose  •  dextrose  •  glucagon (human recombinant)  •  sodium chloride    No Known Allergies    Social History     Socioeconomic History   • Marital status:    Tobacco Use   • Smoking status: Current Every Day Smoker     Packs/day: 1.00     Types: Cigarettes   Vaping Use   • Vaping Use: Unknown   Substance and Sexual Activity   • Alcohol use: Not Currently   • Drug use: Not Currently   • Sexual activity: Defer     History reviewed. No pertinent family history.    Review of Systems  A 14 point review of systems was unobtainable due to his responses that do not answer the question    Vital Signs   Temp:  [96.5 °F (35.8 °C)-96.8 °F (36 °C)] 96.8 °F (36 °C)  Heart Rate:  [] 104  Resp:  [16-21] 21  BP: (117-170)/() 117/62    General Exam:  Head:  Normal cephalic, traumatic-area of contusion over right forehead/eye  HEENT:  Neck supple  Fundoscopic Exam:  No signs of disc edema  CVS:  Regular rate and rhythm.  No murmurs  Carotid Examination:  No bruits  Lungs:  Clear to auscultation  Abdomen:  Non-tender, Non-distended  Extremities:  No signs of peripheral edema  Skin:  No rashes    Neurologic Exam:    Mental Status:    -Awake, Alert, Not answering questions and  talking but difficult to understand at time. -  -Partially will follow some simple  commands    CN II:  Visual fields full to threat.  Pupils equally reactive to light  CN III, IV, VI:  Extraocular Muscles full with no signs of nystagmus  CN V:  Facial sensory--unreliable to test  CN VII:  Facial motor symmetric but ? Subtle decreased movement on the right  CN VIII:  Gross hearing --may be impaired  CN IX/X:  Palate elevates symmetrically  CN XI:  Shoulder shrug symmetric  CN XII:  Tongue is midline on protrusion    Motor: (strength out of 5:  1= minimal movement, 2 = movement in plane of gravity, 3 = movement against gravity, 4 = movement against some resistance, 5 = full strength)    He moves all 4 extremities but will not move the right arm and right leg as readily as the left arm and leg. No clear evidence of weakness.    DTR:  -Right   Bicep: 2+ Triceps: 2+ Brachioradialis: 2+   Patella: 2+ Ankle: 2+ Neg Babinski  -Left   Bicep: 2+ Triceps: 2+ Brachioradialis: 2+   Patella: 2+ Ankle: 2+ Neg Babinski    Sensory:  -Unable to assess light touch, pinprick, temperature, pain, and proprioception    Coordination:  -Finger to nose/Heel to shin--unable to get him to follow these commands-No ataxia    Gait  -Not able to test      Results Review:  Lab Results (last 7 days)     Procedure Component Value Units Date/Time    POC Glucose Once [[id number]]  (Abnormal) Collected: 03/07/22 0915    Specimen: Blood Updated: 03/07/22 0926     Glucose 44 mg/dL      Comment: : [id number] GoCoinMeter ID: [id number]       POC Glucose Once [[id number]]  (Abnormal) Collected: 03/07/22 0913    Specimen: Blood Updated: 03/07/22 0926     Glucose 54 mg/dL      Comment: : [id number] GoCoinMeter ID: [id number]             .  Imaging Results (Last 24 Hours)     Procedure Component Value Units Date/Time    CT Head Without Contrast [[id number]] Resulted: 03/07/22 1120     Updated: 03/07/22 1121                     Impression  1. Recent unwitnessed fall   Head CT from outside hospital suggests small hemorrhage around area of contusion on the left  but this may well be artifact  2. Subacute stroke on background of previous strokes and cerebral microvascular disease  3. Dementia reportedly of the Alzheimer's type  4. Superimposed metabolic encephalopathy  5. Hypoglycemia down to 44--multiple episodes of hypoglycemia   6. Hypotension down to sBP of  97   7. Hypertension up to 170  8. Vitamin D deficiency  9. Patient is DNR/DNI      Plan    Agree with repeat Head CT but may need MRI  Consider EEG but will try and reach family to get a better history of what has taken place.   Replace vitamin D with cholecalciferol  Echo pending  Will try and discuss the situation with the family and get an idea of how aggressive they want us to be.     I discussed the patients findings and my recommendations with patient, nursing staff and Dr Lakhani and Dr Raj URIOSTEGUI. Carlos A Ordoñez MD  03/07/22  12:20 CST      Electronically signed by Jyoti Sotelo MD at 03/07/22 1252         Note h/p dx cva      Nurse note :  Mr Brizuela remains confused r/t events, date location.  He needs frequent orientation and reassurance.  His blood glucose has been low, D50 given 3 times this shift.  D5NS now infusing. [place] has been removed, the urine was blood tinged. He remains in restraints.  Speech passed him for a pureed diet.    Glucose 57  , 49 ,46      B/p 170/152   146/80    hrt rate 104 107  101 108

## 2022-03-08 NOTE — PROGRESS NOTES
"  Neurology Progress Note      Chief Complaint:    Dementia    Subjective     Subjective:  Patient is much more alert this morning, but remains confused.  When we first entered the room, he asked about seeing another person that had \"just walked by.\"  When asked him about hallucinations, it sounds as though he has been having recurrent hallucinations.    Very little success in the ability to redirect the patient or cue him to answer orientation questions successfully.  When he is told he is in the hospital, he seems to agree and understand that that is accurate, but otherwise, independently is unable to provide accurate orientation devices.    Patient is restless this morning and pulling off his hospital gown and bedding.  He has soiled himself.    Follow-up CT of the head was negative for any acute intracranial hemorrhage.  Evidence of remote strokes.    Patient remains on Aricept, Namenda, Risperdal and Zoloft.      Past Medical History:   Diagnosis Date   • Alzheimer's dementia (HCC)    • Diabetes mellitus (HCC)    • Elevated cholesterol    • Hypertension    • Nephrolithiasis      Past Surgical History:   Procedure Laterality Date   • URETERAL STENT INSERTION       History reviewed. No pertinent family history.  Social History     Tobacco Use   • Smoking status: Current Every Day Smoker     Packs/day: 1.00     Types: Cigarettes   Vaping Use   • Vaping Use: Unknown   Substance Use Topics   • Alcohol use: Not Currently   • Drug use: Not Currently       Medications:  Current Facility-Administered Medications   Medication Dose Route Frequency Provider Last Rate Last Admin   • aspirin EC tablet 81 mg  81 mg Oral Daily Fernando Anthony DO       • atorvastatin (LIPITOR) tablet 40 mg  40 mg Oral Nightly Fernando Anthony DO   40 mg at 03/07/22 2020   • cholecalciferol (VITAMIN D3) tablet 5,000 Units  5,000 Units Oral Daily Jyoti Sotelo MD   5,000 Units at 03/08/22 0908   • dextrose (D50W) (25 g/50 mL) IV " injection 25 g  25 g Intravenous Q15 Min PRN Fernando Anthony DO   25 g at 03/07/22 2354   • dextrose (GLUTOSE) oral gel 15 g  15 g Oral Q15 Min PRN Fernando Anthony DO       • donepezil (ARICEPT) tablet 10 mg  10 mg Oral Daily Fernando Anthony DO   10 mg at 03/08/22 0908   • glucagon (human recombinant) (GLUCAGEN DIAGNOSTIC) injection 1 mg  1 mg Intramuscular Q15 Min PRN Fernando Anthony DO       • insulin regular (humuLIN R,novoLIN R) injection 2-7 Units  2-7 Units Subcutaneous Q6H Fernando Anthony DO       • magnesium sulfate 2g/50 mL (PREMIX) infusion  2 g Intravenous Once Fernando Anthony DO       • memantine (NAMENDA) tablet 10 mg  10 mg Oral Q12H Fernando Anthony DO   10 mg at 03/08/22 0908   • risperiDONE (risperDAL) tablet 0.25 mg  0.25 mg Oral Nightly Fernando Anthony DO   0.25 mg at 03/07/22 2020   • sertraline (ZOLOFT) tablet 50 mg  50 mg Oral Daily Fernando Anthony DO   50 mg at 03/08/22 0908   • sodium chloride 0.9 % flush 10 mL  10 mL Intravenous Q12H Fernando Anthony DO   10 mL at 03/08/22 0908   • sodium chloride 0.9 % flush 10 mL  10 mL Intravenous PRN Fernando Anthony DO           Allergies:    Patient has no known allergies.    Review of Systems:   -A 14 point review of systems is completed and is negative.      Objective      Vital Signs  Temp:  [96.8 °F (36 °C)-97.8 °F (36.6 °C)] 97.6 °F (36.4 °C)  Heart Rate:  [] 95  Resp:  [16-21] 18  BP: ()/(55-98) 108/61    Physical Exam:    General Exam:  Head:  Normocephalic, atraumatic.  HEENT: PERRLA.  Full EOM.  Neck:  No lymphadenopathy, thyromegaly or bruit.  Cardiac:  Regular rate and rhythm.  Normal S1, S2.  No murmur, rub or gallop.  Lungs:  Clear to auscultation bilaterally.  No wheeze, rales or rhonchi.  Abdomen:  Non-tender, Non-distended.  Bowel sounds normoactive.  Extremities: Full peripheral pulses.  No clubbing, cyanosis or edema.  Skin: Laceration repair to the right supraorbital region with associated hematoma.       Neurologic Exam:  Mental Status:    -Awake. Alert, but not oriented.  -Restless and attempting to get out of bed and remove his clothing and bedding.  -Does not consistently follow simple commands.     CN II:  Full visual fields with confrontation.  Pupils equally reactive to light.  CN III, IV, VI:  Extraocular muscles function intact with no nystagmus.  CN V:  Facial sensory is symmetric.  CN VII:  Facial motor symmetric.  CN VIII:  Gross hearing intact bilaterally.  CN IX/X:  Palate elevates symmetrically.  CN XI:  Shoulder shrug symmetric.  CN XII:  Tongue is midline on protrusion.     Motor:    -5/5 in bilateral biceps, triceps, brachioradialis, wrist extensors and intrinsic muscles of the hand.    -5/5 in bilateral hip flexors, quadriceps, hamstrings, gastrocsoleus complex, anterior tibialis and extensor hallucis longus.       Deep Tendon Reflexes:  -Right              Biceps: 2+         Triceps: 2+      Brachioradialis: 2+              Patella: 2+       Ankle: 2+           -Left              Biceps: 2+         Triceps: 2+      Brachioradialis: 2+              Patella: 2+       Ankle: 2+         -Patient has frontal lobe release signs with pucker and rooting.        Tone (Modified Barry Scale):  No appreciable increase in tone or rigidity noted.     Sensory:  -Intact to light touch, pinprick BUE (C5-T1) and BLE (L2-S1).     Coordination:  -Does not follow commands consistently for examiner to test.     Gait  -Not observed due to safety and not having a gait belt or assistive device present.       Results Review:    I reviewed the patient's new clinical results and findings.    Lab Results (last 24 hours)     Procedure Component Value Units Date/Time    Hemoglobin A1c [[id number]]  (Abnormal) Collected: 03/08/22 0311    Specimen: Blood Updated: 03/08/22 0448     Hemoglobin A1C 6.70 %     Narrative:      Hemoglobin A1C Ranges:    Increased Risk for Diabetes  5.7% to 6.4%  Diabetes                     >=  6.5%  Diabetic Goal                < 7.0%    Comprehensive Metabolic Panel [[id number]]  (Abnormal) Collected: 03/08/22 0311    Specimen: Blood Updated: 03/08/22 0444     Glucose 101 mg/dL      BUN 8 mg/dL      Creatinine 0.51 mg/dL      Sodium 137 mmol/L      Potassium 3.7 mmol/L      Chloride 100 mmol/L      CO2 27.0 mmol/L      Calcium 9.1 mg/dL      Total Protein 6.1 g/dL      Albumin 3.90 g/dL      ALT (SGPT) 22 U/L      AST (SGOT) 37 U/L      Alkaline Phosphatase 100 U/L      Total Bilirubin 0.8 mg/dL      Globulin 2.2 gm/dL      A/G Ratio 1.8 g/dL      BUN/Creatinine Ratio 15.7     Anion Gap 10.0 mmol/L      eGFR 113.2 mL/min/1.73      Comment: National Kidney Foundation and American Society of Nephrology (ASN) Task Force recommended calculation based on the Chronic Kidney Disease Epidemiology Collaboration (CKD-EPI) equation refit without adjustment for race.       Narrative:      GFR Normal >60  Chronic Kidney Disease <60  Kidney Failure <15      Lipid Panel [[id number]]  (Abnormal) Collected: 03/08/22 0311    Specimen: Blood Updated: 03/08/22 0444     Total Cholesterol 89 mg/dL      Triglycerides 86 mg/dL      HDL Cholesterol 36 mg/dL      LDL Cholesterol  36 mg/dL      VLDL Cholesterol 17 mg/dL      LDL/HDL Ratio 0.99    Narrative:      Cholesterol Reference Ranges  (U.S. Department of Health and Human Services ATP III Classifications)    Desirable          <200 mg/dL  Borderline High    200-239 mg/dL  High Risk          >240 mg/dL      Triglyceride Reference Ranges  (U.S. Department of Health and Human Services ATP III Classifications)    Normal           <150 mg/dL  Borderline High  150-199 mg/dL  High             200-499 mg/dL  Very High        >500 mg/dL    HDL Reference Ranges  (U.S. Department of Health and Human Services ATP III Classifications)    Low     <40 mg/dl (major risk factor for CHD)  High    >60 mg/dl ('negative' risk factor for CHD)        LDL Reference Ranges  (U.S. Department of Health  and Human Services ATP III Classifications)    Optimal          <100 mg/dL  Near Optimal     100-129 mg/dL  Borderline High  130-159 mg/dL  High             160-189 mg/dL  Very High        >189 mg/dL    Magnesium [[id number]]  (Abnormal) Collected: 03/08/22 0311    Specimen: Blood Updated: 03/08/22 0444     Magnesium 1.3 mg/dL     CBC & Differential [[id number]]  (Abnormal) Collected: 03/08/22 0311    Specimen: Blood Updated: 03/08/22 0424    Narrative:      The following orders were created for panel order CBC & Differential.  Procedure                               Abnormality         Status                     ---------                               -----------         ------                     CBC Auto Differential[[id number]]        Abnormal            Final result                 Please view results for these tests on the individual orders.    CBC Auto Differential [[id number]]  (Abnormal) Collected: 03/08/22 0311    Specimen: Blood Updated: 03/08/22 0424     WBC 9.00 10*3/mm3      RBC 3.58 10*6/mm3      Hemoglobin 11.1 g/dL      Hematocrit 31.5 %      MCV 88.0 fL      MCH 31.0 pg      MCHC 35.2 g/dL      RDW 12.1 %      RDW-SD 39.2 fl      MPV 9.7 fL      Platelets 287 10*3/mm3      Neutrophil % 71.9 %      Lymphocyte % 15.7 %      Monocyte % 6.4 %      Eosinophil % 4.9 %      Basophil % 0.8 %      Immature Grans % 0.3 %      Neutrophils, Absolute 6.47 10*3/mm3      Lymphocytes, Absolute 1.41 10*3/mm3      Monocytes, Absolute 0.58 10*3/mm3      Eosinophils, Absolute 0.44 10*3/mm3      Basophils, Absolute 0.07 10*3/mm3      Immature Grans, Absolute 0.03 10*3/mm3      nRBC 0.0 /100 WBC     POC Glucose Once [[id number]]  (Abnormal) Collected: 03/08/22 0039    Specimen: Blood Updated: 03/08/22 0050     Glucose 147 mg/dL      Comment: : OLVIN Jameson AdamMeter ID: [id number]       POC Glucose Once [[id number]]  (Abnormal) Collected: 03/07/22 2347    Specimen: Blood Updated: 03/08/22 0000     Glucose 57 mg/dL       Comment: : [id number] Riley LoriMeter ID: [id number]       POC Glucose Once [[id number]]  (Normal) Collected: 03/07/22 2020    Specimen: Blood Updated: 03/07/22 2030     Glucose 99 mg/dL      Comment: : [id number] TraoreMetjacob ID: [id number]       POC Glucose Once [[id number]]  (Normal) Collected: 03/07/22 1705    Specimen: Blood Updated: 03/07/22 1716     Glucose 96 mg/dL      Comment: : [id number] Ирина CaseynMeter ID: [id number]       POC Glucose Once [[id number]]  (Normal) Collected: 03/07/22 1602    Specimen: Blood Updated: 03/07/22 1614     Glucose 104 mg/dL      Comment: : [id number] Claire WillamsMeter ID: [id number]       POC Glucose Once [[id number]]  (Abnormal) Collected: 03/07/22 1510    Specimen: Blood Updated: 03/07/22 1521     Glucose 49 mg/dL      Comment: : [id number] Claire WillamsMeter ID: [id number]       POC Glucose Once [[id number]]  (Abnormal) Collected: 03/07/22 1508    Specimen: Blood Updated: 03/07/22 1521     Glucose 46 mg/dL      Comment: : [id number] Claire NevescyMeter ID: [id number]       POC Glucose Once [[id number]]  (Abnormal) Collected: 03/07/22 1223    Specimen: Blood Updated: 03/07/22 1234     Glucose 154 mg/dL      Comment: : [id number] Claire NevescyMeter ID: [id number]       POC Glucose Once [[id number]]  (Abnormal) Collected: 03/07/22 1150    Specimen: Blood Updated: 03/07/22 1202     Glucose 67 mg/dL      Comment: : [id number] Claire NevescyMeter ID: [id number]             Imaging Results (Last 24 Hours)     Procedure Component Value Units Date/Time    CT Head Without Contrast [[id number]] Collected: 03/07/22 1354     Updated: 03/07/22 1410    Narrative:      CT HEAD WO CONTRAST- 3/7/2022 11:20 AM CST     HISTORY: f/u imaging, found to have contusion and possible bleed at  outside hospital, also ? Left lacunar infarct       DOSE LENGTH PRODUCT: 809 mGy cm. Automated exposure control was also  utilized to decrease patient radiation dose.      Technique:   Axial CT of the brain without IV contrast. Sagittal and coronal  reformations are also provided for review. Soft tissue and bone kernels  are available for interpretation.     Comparison: None.     Findings:      There is no evidence of acute large vascular territory infarct. Moderate  global cortical atrophy. Underlying chronic small vessel ischemic  change. Age indeterminant left basal ganglia lacunar infarct. There is a  chronic appearing paramedian posterior left frontal lobe cortical  infarct. No intra-axial or extra-axial hemorrhage. No visualized mass  lesion or mass effect. Ventricles are nondilated. Posterior fossa  structures are unremarkable. The scalp and calvarium are intact.  Visualized paranasal sinuses and mastoids are clear.        Impression:      Impression:    1. No definite intracranial hemorrhage on this exam.  2. Moderate global cortical atrophy.  3. Chronic small vessel ischemic change. Age-indeterminate left basal  ganglia lacunar infarct and there is an additional chronic paramedian  posterior left frontal lobe cortical infarct.  This report was finalized on 03/07/2022 14:07 by Dr Jered Oliver, .          Assessment/Plan     Impression:  1.  Progressive dementia with visual hallucination and behavioral disturbance  2.  S/P fall with right frontal contusion and hematoma of the scalp  3.  Superimposed metabolic encephalopathy  4.  Episodes of hypoglycemia  5.  Hypertension, essential  6.  Episodes of hypotension  7.  Adamant D deficiency  8.  DNR/DNI      Plan:  1.  Given the patient's progressive cognitive impairment with behavioral disturbance and now recognized hallucinations, this would give one the suspicion of the possibility of Lewy body dementia as the dementia type.  Fortunately, this is really only confirmed post-mortem, but is in the differential.  Often times, behavioral disturbance and hallucinations are difficult to mitigate in these patients.  2.  CT of the head  is reviewed and demonstrates no acute intracranial process.  3.  Continue recommend vitamin D supplementation.  4.  Doubtful that Namenda and Aricept are providing much benefit at this point in his progression of his disease.  Could potentially discontinue these if they are felt to be of little clinical benefit.  5.  Agree with transfer to the floor, however, patient will need a telesitter or in-room sitter for safety cueing given his attempts to get out of bed, remove his clothing and his disorientation.  6.  Patient's family is unable to provide 24-hour care, he will likely need to be in a skilled nursing facility with a dementia unit.  7.  Risperdal at only 0.25 nightly may not be sufficient to help with his evening behaviors.  Would titrate as needed/indicated.  8.  Little else for neurology to offer at this point.  We will begin to see him less frequently.          Cleveland Correa PA-C  03/08/22  10:05 CST

## 2022-03-08 NOTE — PROGRESS NOTES
[facility] Medicine Services  INPATIENT PROGRESS NOTE    Patient Name: Diego Brizuela  Date of Admission: 3/7/2022  Today's Date: 03/08/22  Length of Stay: 1  Primary Care Physician: Margaux Fisher APRN    Subjective   Chief Complaint: Follow-up syncope/head laceration    HPI   Patient initially transferred to us from outside facility for CT evidence of contusion/brain bleed with stroke.  On arrival patient was significantly confused and combative.  Had to restrain.  He has done better overnight although he is still very impulsive.  He is now awake and interactive.  He is more appropriate but still significantly confused.  Oriented only to self.  No fevers overnight.  Did require some D50 and D5 fluids yesterday for hypoglycemia.  No other acute issues overnight.  Now tolerating p.o.  Repeat CT head yesterday morning arrival showed no brain bleed, contusion, or stroke.      Review of Systems   Unable to assess due to patient factors/non-reliable    Objective    Temp:  [96.5 °F (35.8 °C)-97.8 °F (36.6 °C)] 97.6 °F (36.4 °C)  Heart Rate:  [] 73  Resp:  [16-21] 18  BP: (109-170)/() 109/73  Physical Exam  GEN: Awake, alert, interactive but confused, in NAD  HEENT: sutured lac above R eye brow, PERRLA, Anicteric, Trachea midline  Lungs: no wheezing/rales/rhonchi  Heart: RRR, +S1/s2, no rub  ABD: soft, nt/nd, +BS, no guarding/rebound  Extremities: atraumatic, no cyanosis, no edema  Skin: no rashes or petechiae  Neuro: AAOx1, العلي extremities, no obvious deficits  Psych: flat affect      Results Review:  I have reviewed the labs, radiology results, and diagnostic studies.    Laboratory Data:   Results from last 7 days   Lab Units 03/08/22  0311   WBC 10*3/mm3 9.00   HEMOGLOBIN g/dL 11.1*   HEMATOCRIT % 31.5*   PLATELETS 10*3/mm3 287        Results from last 7 days   Lab Units 03/08/22  0311   SODIUM mmol/L 137   POTASSIUM mmol/L 3.7   CHLORIDE mmol/L 100   CO2  mmol/L 27.0   BUN mg/dL 8   CREATININE mg/dL 0.51*   CALCIUM mg/dL 9.1   BILIRUBIN mg/dL 0.8   ALK PHOS U/L 100   ALT (SGPT) U/L 22   AST (SGOT) U/L 37   GLUCOSE mg/dL 101*       Culture Data:   No results found for: BLOODCX, URINECX, WOUNDCX, MRSACX, RESPCX, STOOLCX    Radiology Data:   Imaging Results (Last 24 Hours)     Procedure Component Value Units Date/Time    CT Head Without Contrast [[id number]] Collected: 03/07/22 1354     Updated: 03/07/22 1410    Narrative:      CT HEAD WO CONTRAST- 3/7/2022 11:20 AM CST     HISTORY: f/u imaging, found to have contusion and possible bleed at  outside hospital, also ? Left lacunar infarct       DOSE LENGTH PRODUCT: 809 mGy cm. Automated exposure control was also  utilized to decrease patient radiation dose.     Technique:   Axial CT of the brain without IV contrast. Sagittal and coronal  reformations are also provided for review. Soft tissue and bone kernels  are available for interpretation.     Comparison: None.     Findings:      There is no evidence of acute large vascular territory infarct. Moderate  global cortical atrophy. Underlying chronic small vessel ischemic  change. Age indeterminant left basal ganglia lacunar infarct. There is a  chronic appearing paramedian posterior left frontal lobe cortical  infarct. No intra-axial or extra-axial hemorrhage. No visualized mass  lesion or mass effect. Ventricles are nondilated. Posterior fossa  structures are unremarkable. The scalp and calvarium are intact.  Visualized paranasal sinuses and mastoids are clear.        Impression:      Impression:    1. No definite intracranial hemorrhage on this exam.  2. Moderate global cortical atrophy.  3. Chronic small vessel ischemic change. Age-indeterminate left basal  ganglia lacunar infarct and there is an additional chronic paramedian  posterior left frontal lobe cortical infarct.  This report was finalized on 03/07/2022 14:07 by Dr Jered Oliver, .          I have reviewed the  patient's current medications.     Assessment/Plan     Active Hospital Problems    Diagnosis    • **Syncope    • Laceration of head    • Fall from standing    • Brain contusion (HCC)    • Type 2 diabetes mellitus without complication, with long-term current use of insulin (HCC)    • HTN (hypertension)    • Dementia with behavioral problem (HCC)    • Hyperlipemia        Plan:  #1 syncope/fall for standing -patient with reported syncope but essentially was found down at home after a fall from standing height with a head laceration.  No witnessed syncope reported.  He was transferred here from outside facility with concern for brain contusion and bleed as well as a stroke.  Our imaging does not definitively show any of those things.  Patient has been seen by neurology and neurosurgery.  At this point suspect he just probably fell in the middle of night at home and hit his head. He is working with speech and passed for p.o. intake.  PT and OT to see today.    #2 DM 2 -patient was reported to be on long-acting insulin at home.  He was hypoglycemic on arrival here.  Also could been potential for cause of his fall at home on top of his dementia.  We have been holding long-acting and doing sliding scale but he has not been needing it.  He is asked required D50 several times.  He was on D5 fluids yesterday and throughout the night.  He is advancing diet.  We will hold fluids today and monitor for any further hypoglycemia.  At discharge would likely only use Metformin and discontinue his glipizide.  A1c 6.7.    #3 head laceration -sutured without issue.  Will need to follow-up with provider for suture removal when appropriate.    #4 essential hypertension -BP has been stable so far without his home lisinopril.  Monitor.    #5 hyperlipidemia -on statin    #6 early onset dementia with behavioral issues -noted.  He is on Aricept, donepezil, multiple other meds.  He was recently hospitalized at [place] with [facility] psych for  issues and meds were adjusted.  He was sent home essentially the condition he was brought there with per wife.  Has not really improved.  This could be progression of his dementia.  He has been dealing with this since around 2010 I believe.  Could be rapidly progressive.  Does have a frontal lobe lesion on head CT at all that also could have affected this.  Regardless not sure there is much else to do at this time for the patient.      Discharge Planning: We will transfer the patient out of the ICU today with a sitter.  We will try to get up and ambulating.  We will monitor off D5 fluids.  He does well tomorrow without any setbacks can probably go home as prior.  Tried to call wife discussed with her but no answer.      Electronically signed by Fernando Anthony DO, 03/08/22, 08:09 CST.
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
## 2022-03-08 NOTE — DISCHARGE PLACEMENT REQUEST
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 JR (t)      SLP Received On 03/07/22  -MB (r) JR (t) MB (c)      SLP Goal Re-Cert Due Date 03/17/22  -MB (r) JR (t) MB (c)              Untimed Charges      26209-VO Eval Oral Pharyng Swallow Minutes 48  -MB (r)  (t) MB (c)              Total Minutes      Untimed Charges Total Minutes  48  -MB (r) JR (t)       Total Minutes 48  -MB (r) JR (t)              User Key  (r) = Recorded By, (t) = Taken By, (c) = Cosigned By      Initials Name Provider Type    Trey Atkinson CCC-SLP Speech and Language Pathologist    Marina Torres, Speech Therapy Student Speech Therapy Student                    Therapy Charges for Today       Code Description Service Date Service Provider Modifiers Qty    50507473444 HC ST EVAL ORAL PHARYNG SWALLOW 3 3/7/2022 Trey Vallejo CCC-SLP GN 1                 ANA ROSA Whiteside  3/7/2022    Electronically signed by Trey Vallejo CCC-SLP at 03/07/22 4267

## 2022-03-08 NOTE — PLAN OF CARE
Goal Outcome Evaluation:  Plan of Care Reviewed With: patient           Outcome Evaluation: PT eval complete. He is alert and oriented to self, confused but able to follow majority of simple 1 step commands w cues. Unsure of prior level of functioning due to current cognition and no family present. Today he stands and ambulates with CGA into the hallway. He demos a  Fast gait at times. Will lead with R shoulder with gait at times, decreased awareness of objects on right side as well. No LOB but high risk for falls and seems generally weak with poor safety awareness. PT will cont with gait, balance, safety awareness and strengthening. Recommend placement for cont recovery. rehab and safety awareness vs home with 24/7 care and HH, pending progress toward home safety, gait and independence with ADL's.
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
## 2022-03-08 NOTE — PLAN OF CARE
Goal Outcome Evaluation:           Progress: no change  Outcome Evaluation: Alert, oriented to self at times this shift. Simple 1 step commands followed. Pt became agitated and uncooperative once during shift, repeatedly insisting to leave the facility and go home, pulling at IV site. Reorientation measures provided without improvement, code grey was called. Pt currently calm, sleeping in bed. Sitter at bedside. Room air. Refused SCDs. Soft ground diet tolerated with thin liquids. Incontinent of urine, urine is pink tinged with small blood clot. Tachy. BGM. Call light in reach. Safety maintained.

## 2022-03-09 LAB
GLUCOSE BLDC GLUCOMTR-MCNC: 161 MG/DL (ref 70–130)
GLUCOSE BLDC GLUCOMTR-MCNC: 186 MG/DL (ref 70–130)
GLUCOSE BLDC GLUCOMTR-MCNC: 202 MG/DL (ref 70–130)
GLUCOSE BLDC GLUCOMTR-MCNC: 204 MG/DL (ref 70–130)
GLUCOSE BLDC GLUCOMTR-MCNC: 273 MG/DL (ref 70–130)

## 2022-03-09 PROCEDURE — 63710000001 INSULIN LISPRO (HUMAN) PER 5 UNITS: Performed by: INTERNAL MEDICINE

## 2022-03-09 PROCEDURE — 82962 GLUCOSE BLOOD TEST: CPT

## 2022-03-09 PROCEDURE — 92526 ORAL FUNCTION THERAPY: CPT | Performed by: SPEECH-LANGUAGE PATHOLOGIST

## 2022-03-09 PROCEDURE — 97116 GAIT TRAINING THERAPY: CPT

## 2022-03-09 PROCEDURE — 97110 THERAPEUTIC EXERCISES: CPT

## 2022-03-09 RX ORDER — DEXTROSE MONOHYDRATE 25 G/50ML
25 INJECTION, SOLUTION INTRAVENOUS
Status: DISCONTINUED | OUTPATIENT
Start: 2022-03-09 | End: 2022-03-09 | Stop reason: SDUPTHER

## 2022-03-09 RX ORDER — NICOTINE POLACRILEX 4 MG
15 LOZENGE BUCCAL
Status: DISCONTINUED | OUTPATIENT
Start: 2022-03-09 | End: 2022-03-09 | Stop reason: SDUPTHER

## 2022-03-09 RX ADMIN — ATORVASTATIN CALCIUM 40 MG: 40 TABLET, FILM COATED ORAL at 20:50

## 2022-03-09 RX ADMIN — DONEPEZIL HYDROCHLORIDE 10 MG: 10 TABLET, FILM COATED ORAL at 09:45

## 2022-03-09 RX ADMIN — SERTRALINE HYDROCHLORIDE 50 MG: 50 TABLET, FILM COATED ORAL at 09:47

## 2022-03-09 RX ADMIN — Medication 10 ML: at 09:46

## 2022-03-09 RX ADMIN — MEMANTINE HYDROCHLORIDE 10 MG: 5 TABLET, FILM COATED ORAL at 09:45

## 2022-03-09 RX ADMIN — INSULIN LISPRO 3 UNITS: 100 INJECTION, SOLUTION INTRAVENOUS; SUBCUTANEOUS at 16:59

## 2022-03-09 RX ADMIN — Medication 5000 UNITS: at 09:45

## 2022-03-09 RX ADMIN — ASPIRIN 81 MG: 81 TABLET, COATED ORAL at 09:45

## 2022-03-09 RX ADMIN — MEMANTINE HYDROCHLORIDE 10 MG: 5 TABLET, FILM COATED ORAL at 20:50

## 2022-03-09 RX ADMIN — RISPERIDONE 0.5 MG: 1 TABLET, FILM COATED ORAL at 20:50

## 2022-03-09 NOTE — PLAN OF CARE
Pt was seen for diet tolerance. Upon arrival, pt was alert and upright in chair with meal tray. Sitter at bedside. Pt stated no c/o pain. Pt and sitter reported no difficulty with current diet. Pt consumed x5 trials of mechanical soft and x2 thin liquids with no overt s/s of aspiration. It was noted that pt took relatively large bites of food and had a tendency to pocket food on right side of oral cavity. Pt was encouraged to complete a lingual sweep before taking bites of food to ensure that no residue had been left in oral cavity. Pt stated understanding. Pt was also encouraged to alternate small bites of food with small sips of thin liquid as he took large consecutive bites of food with no sips of thin liquid between.     Recommendations:  Mechanical soft; thin liquids  Medication whole in applesauce  1:1 supervision with meals and assistance with tray setup  General aspiration precautions  SLP to continue to follow and treat      Marina Hays Speech Therapy Student  12:37 CST  03/09/22            Goal Outcome Evaluation:  Plan of Care Reviewed With: (P) patient, caregiver

## 2022-03-09 NOTE — DISCHARGE PLACEMENT REQUEST
"  SCOTTY LALIT [phone]  Diego Brizuela (66 y.o. Male)             Date of Birth   1955    Social Security Number       Address   [address] [place] [address]    Home Phone   [phone]    MRN   [id number]       Congregational   Other    Marital Status                               Admission Date   3/7/22    Admission Type   Urgent    Admitting Provider   Fernando Anthony DO    Attending Provider   Fernando Anthony DO    Department, Room/Bed   [facility] 3A, 350/1       Discharge Date       Discharge Disposition       Discharge Destination                               Attending Provider: Fernando Anthony DO    Allergies: No Known Allergies    Isolation: None   Infection: None   Code Status: No CPR   Advance Care Planning Activity    Ht: 170.2 cm (67.01\")   Wt: 77.2 kg (170 lb 3.1 oz)    Admission Cmt: None   Principal Problem: Syncope [R55]                 Active Insurance as of 3/7/2022     Primary Coverage     Payor Plan Insurance Group Employer/Plan Group    HUMANA MEDICARE REPLACEMENT HUMANA MEDICARE REPLACEMENT [id number]     Payor Plan Address Payor Plan Phone Number Payor Plan Fax Number Effective Dates    [address] [address] [phone]  1/1/2022 - None Entered    [facility] [address]       Subscriber Name Subscriber Birth Date Member ID       DIEGO BRIZUELA 1955 [id number]                 Emergency Contacts      (Rel.) Home Phone Work Phone Mobile Phone    Maxine Brizuela (Spouse) [phone] -- [phone]               History & Physical      Fernando Anthony DO at 03/07/22 0907              [facility] Medicine Services  HISTORY AND PHYSICAL    Date of Admission: 3/7/2022  Primary Care Physician: Maragux Fisher APRN    Subjective     Chief Complaint: brain contusions/cva    History of Present Illness  Patient is a 66-year-old male with a history of early onset dementia, hypertension, " hyperlipidemia, diabetes.  He apparently was just recently hospitalized at [facility] with psychiatry for behavioral disturbances.  He was admitted on 2/26 and discharged on 3/3.  Apparently last night he was found unresponsive in his bathroom with a laceration over his eye.  He was brought to [place] ER where head CT showed a brain contusion with a possible hemorrhagic component as well as a possible small lacunar stroke.  His labs otherwise were okay with normal renal function and electrolytes.  CBC slightly elevated at 13.  He reportedly was hypotensive in the ER there but has been normotensive to hypertensive since arrival to our ICU.  Was significantly confused on arrival unable to answer questions.  I did call his wife who told me that he has a living will which is a DNR/DNI.  She says he is been acting off since he was sent to [place] and discharged last week.  This essentially is how he has been as far as confusion since that hospital stay but overall that is a decline from prior.  He has not had any other complaints to her.      Review of Systems   Unable to assess due to patient factors    Past Medical History:  Dementia, htn, diabetes  Past Surgical History:No past surgical history on file.  Social History:  reports that he has been smoking cigarettes. He has been smoking about 1.00 pack per day. He does not have any smokeless tobacco history on file. He reports previous alcohol use.    Family History: htn    Allergies:  No Known Allergies    Medications:  Prior to Admission medications    Medication Sig Start Date End Date Taking? Authorizing Provider   aspirin 81 MG chewable tablet Chew 81 mg Daily.    Provider, MD Jacob   atorvastatin (LIPITOR) 40 MG tablet Take 1 tablet by mouth Daily. Indications: Cerebrovascular Accident or Stroke, High Amount of Fats in the Blood 3/4/22   Yoan Floyd II, MD   Canagliflozin (Invokana) 300 MG tablet tablet Take 300 mg by mouth Daily.     Jaocb Rodas MD   cholecalciferol (VITAMIN D3) 1.25 MG (22279 UT) capsule Take 1 capsule by mouth Every 7 (Seven) Days. Indications: Vitamin D Deficiency 3/6/22   Yoan Floyd II, MD   clopidogrel (PLAVIX) 75 MG tablet Take 1 tablet by mouth Daily. Indications: Cerebrovascular Accident or Stroke 3/4/22   Yoan Floyd II, MD   donepezil (ARICEPT) 10 MG tablet Take 10 mg by mouth Daily.    Jacob Rodas MD   glipizide (GLUCOTROL) 5 MG tablet Take 5 mg by mouth 2 (Two) Times a Day Before Meals.    Jacob Rodas MD   lisinopril (PRINIVIL,ZESTRIL) 10 MG tablet Take 10 mg by mouth Daily.    Jacob Rodas MD   melatonin 1 MG tablet Take 1.5 tablets by mouth Every Night. Indications: Circadian entrainment 3/3/22   Yoan Floyd II, MD   memantine (NAMENDA) 10 MG tablet Take 10 mg by mouth 2 (Two) Times a Day.    Jacob Rodas MD   metFORMIN (GLUCOPHAGE) 1000 MG tablet Take 1,000 mg by mouth 2 (Two) Times a Day With Meals.    Jacob Rodas MD   risperiDONE (risperDAL) 0.25 MG tablet Take 1 tablet by mouth Every Night. Indications: Behavioral Disorders associated with Dementia, Visual hallucination related to cognitive disorder 3/3/22   Yoan Floyd II, MD   sertraline (ZOLOFT) 50 MG tablet Take 1 tablet by mouth Daily. Indications: Mood & Anxiety related to neurocognitive disorder 3/4/22   Yoan Floyd II, MD     I have utilized all available immediate resources to obtain, update, and review the patient's current medications.    Objective     Vital Signs: /73   Pulse 92   SpO2 100%   Physical Exam   GEN: Awake, alert, interactive but confused, in NAD  HEENT: sutured lac above R eye brow, PERRLA, Anicteric, Trachea midline  Lungs: no wheezing/rales/rhonchi  Heart: RRR, +S1/s2, no rub  ABD: soft, nt/nd, +BS, no guarding/rebound  Extremities: atraumatic, no cyanosis, no edema  Skin: no rashes or petechiae  Neuro: AAOx1, العلي  extremities, no obvious gross deficits but difficult to examine  Psych: flat affect        Results Reviewed:  Lab Results (last 24 hours)     Procedure Component Value Units Date/Time    POC Glucose Once [[id number]]  (Abnormal) Collected: 03/07/22 0915    Specimen: Blood Updated: 03/07/22 0926     Glucose 44 mg/dL      Comment: : [id number] Claire NancyMeter ID: [id number]       POC Glucose Once [[id number]]  (Abnormal) Collected: 03/07/22 0913    Specimen: Blood Updated: 03/07/22 0926     Glucose 54 mg/dL      Comment: : [id number] Claire NancyMeter ID: [id number]           Imaging Results (Last 24 Hours)     ** No results found for the last 24 hours. **        Labs and imaging from outside hospital reviewed in our chart.  Normal chemistries.  CBC with slight white count elevation at 13.  UA without overt signs of infection.  UDS positive for benzos.  Head CT with Jessa note of 1.3 cm contusion and possible bleed as well as new lacunar infarct.    I have personally reviewed and interpreted the radiology studies and ECG obtained at time of admission.     Assessment / Plan     Assessment:   Active Hospital Problems    Diagnosis    • **CVA (cerebral vascular accident) (HCC)    • Brain contusion (HCC)    • Type 2 diabetes mellitus without complication, with long-term current use of insulin (HCC)    • HTN (hypertension)    • Dementia with behavioral problem (HCC)    • Hyperlipemia      Plan:   #1 CVA -patient reportedly has been off since the end of February for mental standpoint.  He had a head CT on 2/25 prior to admission to [place] which was nonacute.  Head CT at outside facility yesterday after fall shows a possible lacunar infarct that was not there on 2/25 per report.  We will plan for a stroke work-up.  We will have neurology see the patient.  Will repeat head CT and for now to compare given contusion.  Patient currently to active to stay flat for MRI.  I have to reassess.  Check a 2D echo and NICS.   For now continue with statin as prior.  We will hold home aspirin and Plavix given potential brain bleed until follow-up imaging done and seen by neurosurgery.     #2  Brain contusion -1.3 cm with possible hemorrhagic component per outside report.  We will have neurosurgery eval.  As above we will repeat head CT due to compare.    #3 DM2 -takes long-acting insulin at home.  Presented here hyperglycemic.  Treated.  Continue to monitor sugars every 6.  Currently NPO.  Hypoglycemia protocol in place.    #4 essential hypertension -reportedly hypotensive at outside facility but doing okay since arrival here.  Monitor closely.    #5 hyperlipidemia -statin    #6 early onset dementia -noted.  On Aricept and donepezil.  Was recently hospitalized for changes in his mental status but he persisted through that state after discharge.  Unclear if it is any association with potential stroke we are seeing now although unlikely as it is not a frontal stroke.  Again we will get neuro input.    Code Status/Advanced Care Plan: DNR/DNI    The patient's surrogate decision maker is his wife.     I discussed my findings and recommendations with the wife.    Estimated length of stay is 2+ days.     The patient was seen and examined by me on 3/7/22 at 9:05 AM.    Electronically signed by Fernando Anthony DO, 03/07/22, 10:09 CST.                Electronically signed by Fernando Anthony DO at 03/07/22 1010         Current Facility-Administered Medications   Medication Dose Route Frequency Provider Last Rate Last Admin   • aspirin EC tablet 81 mg  81 mg Oral Daily Fernando Anthony DO   81 mg at 03/08/22 1627   • atorvastatin (LIPITOR) tablet 40 mg  40 mg Oral Nightly Fernando Anthony DO   40 mg at 03/08/22 2048   • cholecalciferol (VITAMIN D3) tablet 5,000 Units  5,000 Units Oral Daily Fernando Anthony DO   5,000 Units at 03/08/22 0908   • dextrose (D50W) (25 g/50 mL) IV injection 25 g  25 g Intravenous Q15 Min PRN Fernando Anthony DO   25  "g at 03/07/22 2354   • dextrose (GLUTOSE) oral gel 15 g  15 g Oral Q15 Min PRN Fernando Anthony DO       • donepezil (ARICEPT) tablet 10 mg  10 mg Oral Daily Fernando Anthony DO   10 mg at 03/08/22 0908   • glucagon (human recombinant) (GLUCAGEN DIAGNOSTIC) injection 1 mg  1 mg Intramuscular Q15 Min PRN Fernando Anthony DO       • memantine (NAMENDA) tablet 10 mg  10 mg Oral Q12H Fernando Anthony DO   10 mg at 03/08/22 2048   • risperiDONE (risperDAL) tablet 0.5 mg  0.5 mg Oral Nightly Fernando Anthony DO   0.5 mg at 03/08/22 2048   • sertraline (ZOLOFT) tablet 50 mg  50 mg Oral Daily Fernando Anthony DO   50 mg at 03/08/22 0908   • sodium chloride 0.9 % flush 10 mL  10 mL Intravenous Q12H Fernando Anthony DO   10 mL at 03/08/22 2048   • sodium chloride 0.9 % flush 10 mL  10 mL Intravenous PRN Fernando Anthony DO         Operative/Procedure Notes (last 24 hours)  Notes from 03/08/22 0933 through 03/09/22 0933   No notes of this type exist for this encounter.            Physician Progress Notes (last 24 hours)      Cleveland Correa PA-C at 03/08/22 1004     Attestation signed by Jyoti Sotelo MD at 03/08/22 1405    I have reviewed the documentation above and agree.  I independently saw and examined the patient  Patient much more alert but was hallucinating --talking about another person in the room that was not there, When given multiple choice does choose that he is in the hospital --unable to come up with the word \"hospital\" when asked and will begin to talk about things not asked and in a nonsensical manner. He is able to follow simple commands but also would try to get up out of bed on his own--clearly not aware of his fall risk He is able to feed himself with supervision and can have thin liquids but needs mechanical soft diet for now. Beside his visual field deficit on the right he seems to have unremarkable cranial nerve exam  His strength appears to be 5/5 in the muscles I'm able to test. " "    He was placed on risperidone when he was hospitalized in psych recently so I will not change dose but we may need to consider an increased dose if he continues to hallucinate. He is doing so in a minimal manner that does not seem to cause problems for him but we will need to closely observe    He would benefit from continued  PT/OT and speech therapy                     Neurology Progress Note      Chief Complaint:    Dementia    Subjective     Subjective:  Patient is much more alert this morning, but remains confused.  When we first entered the room, he asked about seeing another person that had \"just walked by.\"  When asked him about hallucinations, it sounds as though he has been having recurrent hallucinations.    Very little success in the ability to redirect the patient or cue him to answer orientation questions successfully.  When he is told he is in the hospital, he seems to agree and understand that that is accurate, but otherwise, independently is unable to provide accurate orientation devices.    Patient is restless this morning and pulling off his hospital gown and bedding.  He has soiled himself.    Follow-up CT of the head was negative for any acute intracranial hemorrhage.  Evidence of remote strokes.    Patient remains on Aricept, Namenda, Risperdal and Zoloft.      Past Medical History:   Diagnosis Date   • Alzheimer's dementia (HCC)    • Diabetes mellitus (HCC)    • Elevated cholesterol    • Hypertension    • Nephrolithiasis      Past Surgical History:   Procedure Laterality Date   • URETERAL STENT INSERTION       History reviewed. No pertinent family history.  Social History     Tobacco Use   • Smoking status: Current Every Day Smoker     Packs/day: 1.00     Types: Cigarettes   Vaping Use   • Vaping Use: Unknown   Substance Use Topics   • Alcohol use: Not Currently   • Drug use: Not Currently       Medications:  Current Facility-Administered Medications   Medication Dose Route Frequency " Provider Last Rate Last Admin   • aspirin EC tablet 81 mg  81 mg Oral Daily Fernando Anthony DO       • atorvastatin (LIPITOR) tablet 40 mg  40 mg Oral Nightly Fernando Anthony DO   40 mg at 03/07/22 2020   • cholecalciferol (VITAMIN D3) tablet 5,000 Units  5,000 Units Oral Daily Jyoti Sotelo MD   5,000 Units at 03/08/22 0908   • dextrose (D50W) (25 g/50 mL) IV injection 25 g  25 g Intravenous Q15 Min PRN Fernando Anthony DO   25 g at 03/07/22 2354   • dextrose (GLUTOSE) oral gel 15 g  15 g Oral Q15 Min PRN Fernando Anthony DO       • donepezil (ARICEPT) tablet 10 mg  10 mg Oral Daily Fernando Anthony DO   10 mg at 03/08/22 0908   • glucagon (human recombinant) (GLUCAGEN DIAGNOSTIC) injection 1 mg  1 mg Intramuscular Q15 Min PRN Fernando Anthony DO       • insulin regular (humuLIN R,novoLIN R) injection 2-7 Units  2-7 Units Subcutaneous Q6H Fernando Anthony DO       • magnesium sulfate 2g/50 mL (PREMIX) infusion  2 g Intravenous Once Fernando Anthony DO       • memantine (NAMENDA) tablet 10 mg  10 mg Oral Q12H Fernando Anthony DO   10 mg at 03/08/22 0908   • risperiDONE (risperDAL) tablet 0.25 mg  0.25 mg Oral Nightly Fernando Anthnoy DO   0.25 mg at 03/07/22 2020   • sertraline (ZOLOFT) tablet 50 mg  50 mg Oral Daily Fernando Anthony DO   50 mg at 03/08/22 0908   • sodium chloride 0.9 % flush 10 mL  10 mL Intravenous Q12H Fernando Anthony DO   10 mL at 03/08/22 0908   • sodium chloride 0.9 % flush 10 mL  10 mL Intravenous PRN Fernando Anthony DO           Allergies:    Patient has no known allergies.    Review of Systems:   -A 14 point review of systems is completed and is negative.      Objective      Vital Signs  Temp:  [96.8 °F (36 °C)-97.8 °F (36.6 °C)] 97.6 °F (36.4 °C)  Heart Rate:  [] 95  Resp:  [16-21] 18  BP: ()/(55-98) 108/61    Physical Exam:    General Exam:  Head:  Normocephalic, atraumatic.  HEENT: PERRLA.  Full EOM.  Neck:  No lymphadenopathy, thyromegaly or  bruit.  Cardiac:  Regular rate and rhythm.  Normal S1, S2.  No murmur, rub or gallop.  Lungs:  Clear to auscultation bilaterally.  No wheeze, rales or rhonchi.  Abdomen:  Non-tender, Non-distended.  Bowel sounds normoactive.  Extremities: Full peripheral pulses.  No clubbing, cyanosis or edema.  Skin: Laceration repair to the right supraorbital region with associated hematoma.      Neurologic Exam:  Mental Status:    -Awake. Alert, but not oriented.  -Restless and attempting to get out of bed and remove his clothing and bedding.  -Does not consistently follow simple commands.     CN II:  Full visual fields with confrontation.  Pupils equally reactive to light.  CN III, IV, VI:  Extraocular muscles function intact with no nystagmus.  CN V:  Facial sensory is symmetric.  CN VII:  Facial motor symmetric.  CN VIII:  Gross hearing intact bilaterally.  CN IX/X:  Palate elevates symmetrically.  CN XI:  Shoulder shrug symmetric.  CN XII:  Tongue is midline on protrusion.     Motor:    -5/5 in bilateral biceps, triceps, brachioradialis, wrist extensors and intrinsic muscles of the hand.    -5/5 in bilateral hip flexors, quadriceps, hamstrings, gastrocsoleus complex, anterior tibialis and extensor hallucis longus.       Deep Tendon Reflexes:  -Right              Biceps: 2+         Triceps: 2+      Brachioradialis: 2+              Patella: 2+       Ankle: 2+           -Left              Biceps: 2+         Triceps: 2+      Brachioradialis: 2+              Patella: 2+       Ankle: 2+         -Patient has frontal lobe release signs with pucker and rooting.        Tone (Modified Barry Scale):  No appreciable increase in tone or rigidity noted.     Sensory:  -Intact to light touch, pinprick BUE (C5-T1) and BLE (L2-S1).     Coordination:  -Does not follow commands consistently for examiner to test.     Gait  -Not observed due to safety and not having a gait belt or assistive device present.       Results Review:    I reviewed the  patient's new clinical results and findings.    Lab Results (last 24 hours)     Procedure Component Value Units Date/Time    Hemoglobin A1c [[id number]]  (Abnormal) Collected: 03/08/22 0311    Specimen: Blood Updated: 03/08/22 0448     Hemoglobin A1C 6.70 %     Narrative:      Hemoglobin A1C Ranges:    Increased Risk for Diabetes  5.7% to 6.4%  Diabetes                     >= 6.5%  Diabetic Goal                < 7.0%    Comprehensive Metabolic Panel [[id number]]  (Abnormal) Collected: 03/08/22 0311    Specimen: Blood Updated: 03/08/22 0444     Glucose 101 mg/dL      BUN 8 mg/dL      Creatinine 0.51 mg/dL      Sodium 137 mmol/L      Potassium 3.7 mmol/L      Chloride 100 mmol/L      CO2 27.0 mmol/L      Calcium 9.1 mg/dL      Total Protein 6.1 g/dL      Albumin 3.90 g/dL      ALT (SGPT) 22 U/L      AST (SGOT) 37 U/L      Alkaline Phosphatase 100 U/L      Total Bilirubin 0.8 mg/dL      Globulin 2.2 gm/dL      A/G Ratio 1.8 g/dL      BUN/Creatinine Ratio 15.7     Anion Gap 10.0 mmol/L      eGFR 113.2 mL/min/1.73      Comment: National Kidney Foundation and American Society of Nephrology (ASN) Task Force recommended calculation based on the Chronic Kidney Disease Epidemiology Collaboration (CKD-EPI) equation refit without adjustment for race.       Narrative:      GFR Normal >60  Chronic Kidney Disease <60  Kidney Failure <15      Lipid Panel [[id number]]  (Abnormal) Collected: 03/08/22 0311    Specimen: Blood Updated: 03/08/22 0444     Total Cholesterol 89 mg/dL      Triglycerides 86 mg/dL      HDL Cholesterol 36 mg/dL      LDL Cholesterol  36 mg/dL      VLDL Cholesterol 17 mg/dL      LDL/HDL Ratio 0.99    Narrative:      Cholesterol Reference Ranges  (U.S. Department of Health and Human Services ATP III Classifications)    Desirable          <200 mg/dL  Borderline High    200-239 mg/dL  High Risk          >240 mg/dL      Triglyceride Reference Ranges  (U.S. Department of Health and Human Services ATP III  Classifications)    Normal           <150 mg/dL  Borderline High  150-199 mg/dL  High             200-499 mg/dL  Very High        >500 mg/dL    HDL Reference Ranges  (U.S. Department of Health and Human Services ATP III Classifications)    Low     <40 mg/dl (major risk factor for CHD)  High    >60 mg/dl ('negative' risk factor for CHD)        LDL Reference Ranges  (U.S. Department of Health and Human Services ATP III Classifications)    Optimal          <100 mg/dL  Near Optimal     100-129 mg/dL  Borderline High  130-159 mg/dL  High             160-189 mg/dL  Very High        >189 mg/dL    Magnesium [[id number]]  (Abnormal) Collected: 03/08/22 0311    Specimen: Blood Updated: 03/08/22 0444     Magnesium 1.3 mg/dL     CBC & Differential [[id number]]  (Abnormal) Collected: 03/08/22 0311    Specimen: Blood Updated: 03/08/22 0424    Narrative:      The following orders were created for panel order CBC & Differential.  Procedure                               Abnormality         Status                     ---------                               -----------         ------                     CBC Auto Differential[[id number]]        Abnormal            Final result                 Please view results for these tests on the individual orders.    CBC Auto Differential [[id number]]  (Abnormal) Collected: 03/08/22 0311    Specimen: Blood Updated: 03/08/22 0424     WBC 9.00 10*3/mm3      RBC 3.58 10*6/mm3      Hemoglobin 11.1 g/dL      Hematocrit 31.5 %      MCV 88.0 fL      MCH 31.0 pg      MCHC 35.2 g/dL      RDW 12.1 %      RDW-SD 39.2 fl      MPV 9.7 fL      Platelets 287 10*3/mm3      Neutrophil % 71.9 %      Lymphocyte % 15.7 %      Monocyte % 6.4 %      Eosinophil % 4.9 %      Basophil % 0.8 %      Immature Grans % 0.3 %      Neutrophils, Absolute 6.47 10*3/mm3      Lymphocytes, Absolute 1.41 10*3/mm3      Monocytes, Absolute 0.58 10*3/mm3      Eosinophils, Absolute 0.44 10*3/mm3      Basophils, Absolute 0.07 10*3/mm3       Immature Grans, Absolute 0.03 10*3/mm3      nRBC 0.0 /100 WBC     POC Glucose Once [[id number]]  (Abnormal) Collected: 03/08/22 0039    Specimen: Blood Updated: 03/08/22 0050     Glucose 147 mg/dL      Comment: : [id number] Trino TraoreMeter ID: [id number]       POC Glucose Once [[id number]]  (Abnormal) Collected: 03/07/22 2347    Specimen: Blood Updated: 03/08/22 0000     Glucose 57 mg/dL      Comment: : [id number] Riley LoriMeter ID: [id number]       POC Glucose Once [[id number]]  (Normal) Collected: 03/07/22 2020    Specimen: Blood Updated: 03/07/22 2030     Glucose 99 mg/dL      Comment: : [id number] Jameson AdamMeter ID: [id number]       POC Glucose Once [[id number]]  (Normal) Collected: 03/07/22 1705    Specimen: Blood Updated: 03/07/22 1716     Glucose 96 mg/dL      Comment: : [id number] Ирина Banks ID: [id number]       POC Glucose Once [[id number]]  (Normal) Collected: 03/07/22 1602    Specimen: Blood Updated: 03/07/22 1614     Glucose 104 mg/dL      Comment: : [id number] Claire NevescyMeter ID: [id number]       POC Glucose Once [[id number]]  (Abnormal) Collected: 03/07/22 1510    Specimen: Blood Updated: 03/07/22 1521     Glucose 49 mg/dL      Comment: : [id number] Claire NevescyMeter ID: [id number]       POC Glucose Once [[id number]]  (Abnormal) Collected: 03/07/22 1508    Specimen: Blood Updated: 03/07/22 1521     Glucose 46 mg/dL      Comment: : [id number] Claire NancyMeter ID: [id number]       POC Glucose Once [[id number]]  (Abnormal) Collected: 03/07/22 1223    Specimen: Blood Updated: 03/07/22 1234     Glucose 154 mg/dL      Comment: : [id number] Claire NancyMeter ID: [id number]       POC Glucose Once [[id number]]  (Abnormal) Collected: 03/07/22 1150    Specimen: Blood Updated: 03/07/22 1202     Glucose 67 mg/dL      Comment: : [id number] Claire Winston ID: [id number]             Imaging Results (Last 24 Hours)     Procedure Component Value Units Date/Time    CT  Head Without Contrast [[id number]] Collected: 03/07/22 1354     Updated: 03/07/22 1410    Narrative:      CT HEAD WO CONTRAST- 3/7/2022 11:20 AM CST     HISTORY: f/u imaging, found to have contusion and possible bleed at  outside hospital, also ? Left lacunar infarct       DOSE LENGTH PRODUCT: 809 mGy cm. Automated exposure control was also  utilized to decrease patient radiation dose.     Technique:   Axial CT of the brain without IV contrast. Sagittal and coronal  reformations are also provided for review. Soft tissue and bone kernels  are available for interpretation.     Comparison: None.     Findings:      There is no evidence of acute large vascular territory infarct. Moderate  global cortical atrophy. Underlying chronic small vessel ischemic  change. Age indeterminant left basal ganglia lacunar infarct. There is a  chronic appearing paramedian posterior left frontal lobe cortical  infarct. No intra-axial or extra-axial hemorrhage. No visualized mass  lesion or mass effect. Ventricles are nondilated. Posterior fossa  structures are unremarkable. The scalp and calvarium are intact.  Visualized paranasal sinuses and mastoids are clear.        Impression:      Impression:    1. No definite intracranial hemorrhage on this exam.  2. Moderate global cortical atrophy.  3. Chronic small vessel ischemic change. Age-indeterminate left basal  ganglia lacunar infarct and there is an additional chronic paramedian  posterior left frontal lobe cortical infarct.  This report was finalized on 03/07/2022 14:07 by Dr Jered Oliver, .          Assessment/Plan     Impression:  1.  Progressive dementia with visual hallucination and behavioral disturbance  2.  S/P fall with right frontal contusion and hematoma of the scalp  3.  Superimposed metabolic encephalopathy  4.  Episodes of hypoglycemia  5.  Hypertension, essential  6.  Episodes of hypotension  7.  Adamant D deficiency  8.  DNR/DNI      Plan:  1.  Given the patient's  progressive cognitive impairment with behavioral disturbance and now recognized hallucinations, this would give one the suspicion of the possibility of Lewy body dementia as the dementia type.  Fortunately, this is really only confirmed post-mortem, but is in the differential.  Often times, behavioral disturbance and hallucinations are difficult to mitigate in these patients.  2.  CT of the head is reviewed and demonstrates no acute intracranial process.  3.  Continue recommend vitamin D supplementation.  4.  Doubtful that Namenda and Aricept are providing much benefit at this point in his progression of his disease.  Could potentially discontinue these if they are felt to be of little clinical benefit.  5.  Agree with transfer to the floor, however, patient will need a telesitter or in-room sitter for safety cueing given his attempts to get out of bed, remove his clothing and his disorientation.  6.  Patient's family is unable to provide 24-hour care, he will likely need to be in a skilled nursing facility with a dementia unit.  7.  Risperdal at only 0.25 nightly may not be sufficient to help with his evening behaviors.  Would titrate as needed/indicated.  8.  Little else for neurology to offer at this point.  We will begin to see him less frequently.          Cleveland Correa PA-C  03/08/22  10:05 CST      Electronically signed by Jyoti Sotelo MD at 03/08/22 1405       Consult Notes (last 24 hours)  Notes from 03/08/22 0933 through 03/09/22 0933   No notes of this type exist for this encounter.       Mikaela Conti RN    Registered Nurse   Nursing   Plan of Care       Signed   Date of Service:  03/09/22 0301   Creation Time:  03/09/22 0301              Signed              Show:Clear all  []Manual[x]Template[]Copied    Added by:  [x]Mikaela Conti RN      []Leno for details    Goal Outcome Evaluation:  Outcome Evaluation: Alert, oriented to self only. Pt has been restless this shift and frequently  moving around in bed. Incontinent of urine, urine pink/ red tinged. Room air. Tolerating PO. Frequent reorientation provided without success. Safety mainatined. Call light in reach.                  Audra Rocha, RN    Registered Nurse   Nurse Navigator   Plan of Care       Signed   Date of Service:  03/08/22 1538   Creation Time:  03/08/22 1538              Signed              Show:Clear all  []Manual[x]Template[]Copied    Added by:  [x]Audra Rocha, RN      []Leno for details    Goal Outcome Evaluation:  Progress: no change  Outcome Evaluation: Alert, oriented to self at times this shift. Simple 1 step commands followed. Pt became agitated and uncooperative once during shift, repeatedly insisting to leave the facility and go home, pulling at IV site. Reorientation measures provided without improvement, code grey was called. Pt currently calm, sleeping in bed. Sitter at bedside. Room air. Refused SCDs. Soft ground diet tolerated with thin liquids. Incontinent of urine, urine is pink tinged with small blood clot. Tachy. BGM. Call light in reach. Safety maintained.

## 2022-03-09 NOTE — PLAN OF CARE
Goal Outcome Evaluation:              Outcome Evaluation: Alert, oriented to self only. Pt has been restless this shift and frequently moving around in bed. Incontinent of urine, urine pink/ red tinged. Room air. Tolerating PO. Frequent reorientation provided without success. Safety mainatined. Call light in reach.

## 2022-03-09 NOTE — PLAN OF CARE
Goal Outcome Evaluation:  Plan of Care Reviewed With: patient        Progress: improving  Outcome Evaluation: PT tx completed. Pt sitting up in bed, no c/o. Bed mobility I, transfers cg, amb 200' x 2 CG/Keo. Decreased safety awareness, follows commands, but has to be redirected for tasks.

## 2022-03-09 NOTE — PROGRESS NOTES
[facility] Medicine Services  INPATIENT PROGRESS NOTE    Patient Name: Diego Brizuela  Date of Admission: 3/7/2022  Today's Date: 03/09/22  Length of Stay: 2  Primary Care Physician: Margaux Fisher APRN    Subjective   Chief Complaint: Follow-up syncope/head laceration    HPI   Patient is doing a little better today from a behavior standpoint after increasing Risperdal last night.  He is alert and interactive.  She is confused and oriented to self only.  No new issues overnight.  No complaints.  On room air.      Review of Systems   Unable to assess due to patient factors/non-reliable    Objective    Temp:  [97.5 °F (36.4 °C)-97.9 °F (36.6 °C)] 97.5 °F (36.4 °C)  Heart Rate:  [] 92  Resp:  [16-18] 16  BP: ()/(59-95) 103/85  Physical Exam  GEN: Awake, alert, interactive but confused, in NAD  HEENT: sutured lac above R eye brow, PERRLA, Anicteric, Trachea midline  Lungs: no wheezing/rales/rhonchi  Heart: RRR, +S1/s2, no rub  ABD: soft, nt/nd, +BS, no guarding/rebound  Extremities: atraumatic, no cyanosis, no edema  Skin: no rashes or petechiae  Neuro: AAOx1, العلي extremities, no obvious deficits  Psych: flat affect      Results Review:  I have reviewed the labs, radiology results, and diagnostic studies.    Laboratory Data:   Results from last 7 days   Lab Units 03/08/22  0311   WBC 10*3/mm3 9.00   HEMOGLOBIN g/dL 11.1*   HEMATOCRIT % 31.5*   PLATELETS 10*3/mm3 287        Results from last 7 days   Lab Units 03/08/22  0311   SODIUM mmol/L 137   POTASSIUM mmol/L 3.7   CHLORIDE mmol/L 100   CO2 mmol/L 27.0   BUN mg/dL 8   CREATININE mg/dL 0.51*   CALCIUM mg/dL 9.1   BILIRUBIN mg/dL 0.8   ALK PHOS U/L 100   ALT (SGPT) U/L 22   AST (SGOT) U/L 37   GLUCOSE mg/dL 101*       Culture Data:   No results found for: BLOODCX, URINECX, WOUNDCX, MRSACX, RESPCX, STOOLCX    Radiology Data:   Imaging Results (Last 24 Hours)     ** No results found for the last 24 hours. **           I have reviewed the patient's current medications.     Assessment/Plan     Active Hospital Problems    Diagnosis    • **Syncope    • Laceration of head    • Fall from standing    • Type 2 diabetes mellitus without complication, with long-term current use of insulin (HCC)    • HTN (hypertension)    • Dementia with behavioral problem (HCC)    • Hyperlipemia        Plan:  #1 syncope/fall for standing -patient with reported syncope but essentially was found down at home after a fall from standing height with a head laceration.  No witnessed syncope actually reported.  He was transferred here from outside facility and placed in our unit as precaution due to reports of a brain contusion and bleed as well as a stroke.  However our imaging does not show any of those things.  Patient has been seen by neurology and neurosurgery and cleared.  Doing well.  Transferred out of the unit as he did not require it once those issues were ruled out.  At this point suspect he just probably fell in the middle of night at home while wandering and hit his head.  He worked with speech and passed swallow study without issue.  He has been up and ambulatory with PT and OT.  Doing well.    #2 DM 2 -on sliding scale insulin and hypoglycemia protocol.  Doing well.    #3 head laceration -sutured without issue.  Will need to follow-up for suture removal when appropriate.    #4 essential hypertension -BP has been stable so far without his home lisinopril.  Monitor.    #5 hyperlipidemia -on statin    #6 early onset dementia with behavioral issues -noted.  He is on Aricept, donepezil, multiple other meds.  Benzodiazepines have been discontinued prior.  He was started on Risperdal.  I increased his withdrawal last night.  Seems to be doing okay with that.  Feels at this time all of his active issues are in the setting of his impulsivity and dementia with need for further med adjustments.      Discharge Planning: Patient needs [facility] placement  for ongoing adjustments of his medications.  Is otherwise completely stable from medical standpoint.  Case management trying to work on placement.    Electronically signed by Fernando Anthony DO, 03/09/22, 10:18 CST.

## 2022-03-09 NOTE — CASE MANAGEMENT/SOCIAL WORK
Continued Stay Note  [facility]     Patient Name: Diego Brizuela  MRN: [id number]  Today's Date: 3/9/2022    Admit Date: 3/7/2022     Discharge Plan     Row Name 03/09/22 0930       Plan    Plan [facility]    Patient/Family in Agreement with Plan yes    Plan Comments Spoke with Maxine Spouse [phone] re: need for [facility] for medicine regulation. She prefers paient not return to [facility] due to distance from her.  She does want referral given to both [facility] and [facility].  Will list at both and inform if bed offered.    [facility] informed they will not be offering pt a bed there.  Called and spoke with Iraida at [facility], she is going to see if referral received and if not call this SW back. Told her we are needing decision asap. Will follow.     1402- Iraida called back and referral not located so resent to them. She will call if does not receive in 15 min.     Iraida from [facility] saying MD there saying pt is not medically stable yet for them, stating he just got out of critical care unit yesterday.  If pt still here in AM will provide updated records to include labs. Will give update to Dr. Anthony as he spoke with family re: pt returning to [facility] in [facility] if no options closer. Will follow.                Discharge Codes    No documentation.                     TASIA Butts

## 2022-03-09 NOTE — PLAN OF CARE
Goal Outcome Evaluation:  Plan of Care Reviewed With: patient        Progress: improving   Pt a/o to self, vss, room air. Pt ambulating x1 assist. Pills in applesauce. Pts urine is pink/red w/ small clotMD madhu aware, no orders received. Pt is not on sliding scale. No IV present. [facility] done this am with alexandru Bello. Pt up to chair most of the day. Pt ambulated the vidales a couple times. Bed locked, lowest position, side rails x3, call light in reach, bed/chair alarm on.
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
## 2022-03-10 VITALS
HEART RATE: 62 BPM | OXYGEN SATURATION: 96 % | RESPIRATION RATE: 16 BRPM | WEIGHT: 170.19 LBS | DIASTOLIC BLOOD PRESSURE: 64 MMHG | HEIGHT: 67 IN | BODY MASS INDEX: 26.71 KG/M2 | SYSTOLIC BLOOD PRESSURE: 117 MMHG | TEMPERATURE: 97.7 F

## 2022-03-10 LAB
GLUCOSE BLDC GLUCOMTR-MCNC: 172 MG/DL (ref 70–130)
GLUCOSE BLDC GLUCOMTR-MCNC: 182 MG/DL (ref 70–130)

## 2022-03-10 PROCEDURE — 82962 GLUCOSE BLOOD TEST: CPT

## 2022-03-10 PROCEDURE — 92526 ORAL FUNCTION THERAPY: CPT

## 2022-03-10 PROCEDURE — 63710000001 INSULIN LISPRO (HUMAN) PER 5 UNITS: Performed by: INTERNAL MEDICINE

## 2022-03-10 PROCEDURE — 97116 GAIT TRAINING THERAPY: CPT

## 2022-03-10 RX ADMIN — ASPIRIN 81 MG: 81 TABLET, COATED ORAL at 08:16

## 2022-03-10 RX ADMIN — INSULIN LISPRO 2 UNITS: 100 INJECTION, SOLUTION INTRAVENOUS; SUBCUTANEOUS at 11:20

## 2022-03-10 RX ADMIN — INSULIN LISPRO 2 UNITS: 100 INJECTION, SOLUTION INTRAVENOUS; SUBCUTANEOUS at 08:14

## 2022-03-10 RX ADMIN — DONEPEZIL HYDROCHLORIDE 10 MG: 10 TABLET, FILM COATED ORAL at 08:16

## 2022-03-10 RX ADMIN — Medication 5000 UNITS: at 08:16

## 2022-03-10 RX ADMIN — MEMANTINE HYDROCHLORIDE 10 MG: 5 TABLET, FILM COATED ORAL at 08:16

## 2022-03-10 RX ADMIN — SERTRALINE HYDROCHLORIDE 50 MG: 50 TABLET, FILM COATED ORAL at 08:16

## 2022-03-10 NOTE — PLAN OF CARE
Pt was seen for diet tolerance. Upon arrival, pt had just woken up and received his meal tray. Pt required some encouragement to complete PO trials but participated to consume x3 regular solid and x2 thin liquid with no overt s/s of aspiration. Pocking of food on right side of oral cavity was observed but pt cleared independently. RN, May, reported that pt lung sounds are clear and pt took medications whole with thin liquids with no s/s of aspiration noted.       Recommendations:  1. Mechanical soft; thin  2. Meds whole with thin liquids   3. General aspiration precautions  4. ST to continue to monitor diet toleration PRN.  5. ST to complete cognitive evaluation    Marina Hays, Speech Therapy Student  13:29 CST  03/10/22          Goal Outcome Evaluation:  Plan of Care Reviewed With: (P) patient, caregiver

## 2022-03-10 NOTE — CASE MANAGEMENT/SOCIAL WORK
Continued Stay Note  [facility]     Patient Name: Diego Brizuela  MRN: [id number]  Today's Date: 3/10/2022    Admit Date: 3/7/2022     Discharge Plan     Row Name 03/10/22 0917       Plan    Plan Waiting on decision from [facility]- updated records sent    Patient/Family in Agreement with Plan yes    Plan Comments Sent updated records to Iraida at [facility] and request return call on whether they can accept patient now. Will follow.               Discharge Codes    No documentation.                     TASIA Butts
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
## 2022-03-10 NOTE — PLAN OF CARE
Goal Outcome Evaluation:  Plan of Care Reviewed With: patient        Progress: no change  Outcome Evaluation: Pt disoriented x4. Up x1 w/ ast. Stitches to R eyebrow. Inc w/ pull-up. Sugar monitored. Sitter at BS. Plans for DC to [facility] by EMS due to behavior. Safety maintained. Will cont to monitor.

## 2022-03-10 NOTE — PLAN OF CARE
Goal Outcome Evaluation:         Patient resting poorly throughout the night.  Remains pleasantly confused, but restless intermitt.  Safety sitter at bedside to maintain patient safety.  Urine pink tinged with minimal clots present.  Denies pain.

## 2022-03-10 NOTE — DISCHARGE SUMMARY
[facility]  DISCHARGE SUMMARY       Date of Admission: 3/7/2022  Date of Discharge:  3/10/2022  Primary Care Physician: Margaux Fisher APRN    Presenting Problem/History of Present Illness:  CVA (cerebral vascular accident) (HCC) [I63.9]     Final Discharge Diagnoses:  Active Hospital Problems    Diagnosis    • **Syncope    • Laceration of head    • Fall from standing    • Type 2 diabetes mellitus without complication, with long-term current use of insulin (HCC)    • HTN (hypertension)    • Dementia with behavioral problem (HCC)    • Hyperlipemia        Consults:   #1 Dr. Lakhani, neurosurgery  #2 Dr. Morris, neurology    Procedures Performed: none    Pertinent Test Results:  Procedure Component Value Units Date/Time   CT Head Without Contrast [[id number]] Kimani as Reviewed   Order Status: Completed Collected: 03/07/22 1354    Updated: 03/07/22 1410   Narrative:     CT HEAD WO CONTRAST- 3/7/2022 11:20 AM CST       HISTORY: f/u imaging, found to have contusion and possible bleed at   outside hospital, also ? Left lacunar infarct         DOSE LENGTH PRODUCT: 809 mGy cm. Automated exposure control was also   utilized to decrease patient radiation dose.       Technique:   Axial CT of the brain without IV contrast. Sagittal and coronal   reformations are also provided for review. Soft tissue and bone kernels   are available for interpretation.       Comparison: None.       Findings:       There is no evidence of acute large vascular territory infarct. Moderate   global cortical atrophy. Underlying chronic small vessel ischemic   change. Age indeterminant left basal ganglia lacunar infarct. There is a   chronic appearing paramedian posterior left frontal lobe cortical   infarct. No intra-axial or extra-axial hemorrhage. No visualized mass   lesion or mass effect. Ventricles are nondilated. Posterior fossa   structures are unremarkable. The scalp and  calvarium are intact.   Visualized paranasal sinuses and mastoids are clear.       Impression:     Impression:     1. No definite intracranial hemorrhage on this exam.   2. Moderate global cortical atrophy.   3. Chronic small vessel ischemic change. Age-indeterminate left basal   ganglia lacunar infarct and there is an additional chronic paramedian   posterior left frontal lobe cortical infarct.   This report was finalized on 03/07/2022 14:07 by Dr Jered Oliver, .   CT Angiogram Head w AI Analysis of LVO [[id number]] Kimani as Reviewed   Order Status: Completed Collected: 02/28/22 1236    Updated: 03/01/22 0741   Narrative:     CT angiography of the neck, carotid arteries with contrast. CT   angiography head, intracranial circulation.     Clinical Indication: Stroke protocol, dysphagia.   .     Comparison: None.     Technique: The study was acquired in a helical fashion with   multiplanar thin-section reconstructions following uneventful   intravenous administration of rapid bolus iodinated contrast   material. 85 mL Isovue 370.     Additional three-dimensional images were performed on the   independent [employer] workstation by a radiologist and were   transferred to the PACS station for further evaluation.   Measurement of carotid stenosis based on NASCET method for   calculating the degree of stenosis.  The NASCET method calculates   the degree of stenosis with reference to the lumen of the carotid   artery distal to the stenosis.       This exam was performed according to our departmental   dose-optimization program, which includes automated exposure   control, adjustment of the mA and/or kV according to patient size   and/or use of iterative reconstruction technique.           Findings: Minimal plaque right and left carotid bifurcations and   proximal internal carotid arteries. No stenosis.     Normal symmetrical vertebral arteries. Plaque of the origin of   the right vertebral artery without significant stenosis.      Normal origins of the great vessels.   Normal basilar artery. Normal cerebellar arteries. Normal   posterior cerebral arteries.     Normal right and left middle cerebral arteries. Small atretic A1   segment left anterior cerebral artery (normal variant).     Anterior cerebral arteries are otherwise unremarkable.    Impression:     Minimal plaque both carotid bifurcations and proximal   internal carotid arteries without significant stenosis. Normal   vertebral arteries. Normal origins of the great vessels.     Normal CT angiography intracranial circulation. No intracranial   vascular anomalies are appreciated.     Electronically signed by:  Jame Head MD  2/28/2022 3:53 PM CST   Workstation: [id number]   CT Angiogram Neck [[id number]] Kimani as Reviewed   Order Status: Completed Collected: 02/28/22 1236    Updated: 03/01/22 0741   Narrative:     CT angiography of the neck, carotid arteries with contrast. CT   angiography head, intracranial circulation.     Clinical Indication: Stroke protocol, dysphagia.   .     Comparison: None.     Technique: The study was acquired in a helical fashion with   multiplanar thin-section reconstructions following uneventful   intravenous administration of rapid bolus iodinated contrast   material. 85 mL Isovue 370.     Additional three-dimensional images were performed on the   independent [employer] workstation by a radiologist and were   transferred to the PACS station for further evaluation.   Measurement of carotid stenosis based on NASCET method for   calculating the degree of stenosis.  The NASCET method calculates   the degree of stenosis with reference to the lumen of the carotid   artery distal to the stenosis.       This exam was performed according to our departmental   dose-optimization program, which includes automated exposure   control, adjustment of the mA and/or kV according to patient size   and/or use of iterative reconstruction technique.           Findings:  Minimal plaque right and left carotid bifurcations and   proximal internal carotid arteries. No stenosis.     Normal symmetrical vertebral arteries. Plaque of the origin of   the right vertebral artery without significant stenosis.     Normal origins of the great vessels.   Normal basilar artery. Normal cerebellar arteries. Normal   posterior cerebral arteries.     Normal right and left middle cerebral arteries. Small atretic A1   segment left anterior cerebral artery (normal variant).     Anterior cerebral arteries are otherwise unremarkable.    Impression:     Minimal plaque both carotid bifurcations and proximal   internal carotid arteries without significant stenosis. Normal   vertebral arteries. Normal origins of the great vessels.     Normal CT angiography intracranial circulation. No intracranial   vascular anomalies are appreciated.     Electronically signed by:  Jame Head MD  2/28/2022 3:53 PM CST   Workstation: [id number]         Results for orders placed during the hospital encounter of 03/07/22    Adult Transthoracic Echo Complete W/ Cont if Necessary Per Protocol (With Agitated Saline)    Interpretation Summary  · Left ventricular systolic function is normal. Left ventricular ejection fraction appears to be 56 - 60%.  · Left ventricular wall thickness is consistent with mild concentric hypertrophy.  · Normal right ventricular cavity size and systolic function noted.  · No evidence of a patent foramen ovale.  · No significant valvular abnormalities identified on this study.      Chief Complaint on Day of Discharge:   F/u fall, confusion, head lac    History of Present Illness on Day of Discharge:   Patient was more sleepy this morning then prior after dose increase of Risperdal but he also had not slept in several days.  He is able to be awoken easily.  He is interactive.  Still confused.  No other acute events noted.  Cooperative.    Hospital Course:  The patient is a 66 y.o. male with a history of  "severe early onset dementia.  Hypertension.  Hyperlipidemia.  Diabetes.  He presented to the hospital on 3/7 after being found in the bathroom on his floor in the middle the night with a head laceration.  He initially was transferred here from outside facility with concern for possible brain contusion and bleed and stroke.  However when he got here a CT of his head was done which showed no evidence for either of these issues.  He was seen by neurology and neurosurgery who felt there were no active ongoing issues at this time from those perspectives.  His blood sugar was low on arrival and he needed some D5 fluids the first 24 hours until he was more awake to eat.  Suspect he was getting long-acting insulin at home that caused this.  This has been discontinued.  He is now doing well off of that on sliding scale we will plan for oral glucose meds at discharge.  He otherwise was doing well and had no obvious signs of infections or acute issues.  He is walking 200 feet with physical therapy.  He is tolerating p.o.  Main ongoing issue is his dementia and confusion/impulsivity.  Feel he needs further care from this perspective.  Wife unable to take him home in his current condition.  Arrangements were made for placement at SNF.  While here his dose of Risperdal was increased from 0.25 2.5 but this caused a little bit of increased somnolence.  Will reduce back to previous dose and allow further outpatient adjustments as appropriate.  Otherwise patient medically stable for discharge to facility today.    Condition on Discharge: Stable on room air.  Ambulating 200 feet.    Physical Exam on Discharge:  /64 (BP Location: Left arm)   Pulse 62   Temp 97.7 °F (36.5 °C) (Axillary)   Resp 16   Ht 170.2 cm (67.01\")   Wt 77.2 kg (170 lb 3.1 oz)   SpO2 96%   BMI 26.65 kg/m²   Physical Exam  GEN: Awake, alert, interactive but confused, in NAD  HEENT: sutured lac above R eye brow, PERRLA, Anicteric, Trachea midline  Lungs: " no wheezing/rales/rhonchi  Heart: RRR, +S1/s2, no rub  ABD: soft, nt/nd, +BS, no guarding/rebound  Extremities: atraumatic, no cyanosis, no edema  Skin: no rashes or petechiae  Neuro: AAOx1, العلي extremities, no obvious deficits  Psych: flat affect    Discharge Disposition:  Skilled Nursing Facility (DC - External)    Discharge Medications:     Discharge Medications      Changes to Medications      Instructions Start Date   atorvastatin 40 MG tablet  Commonly known as: LIPITOR  What changed: how much to take   40 mg, Oral, Daily         Continue These Medications      Instructions Start Date   aspirin 81 MG chewable tablet   81 mg, Oral, Daily      cholecalciferol 1.25 MG (77245 UT) capsule  Commonly known as: VITAMIN D3   50,000 Units, Oral, Every 7 Days      clopidogrel 75 MG tablet  Commonly known as: PLAVIX   75 mg, Oral, Daily      donepezil 10 MG tablet  Commonly known as: ARICEPT   10 mg, Oral, Daily      melatonin 1 MG tablet   1.5 mg, Oral, Nightly      memantine 10 MG tablet  Commonly known as: NAMENDA   10 mg, Oral, 2 Times Daily      metFORMIN 1000 MG tablet  Commonly known as: GLUCOPHAGE   1,000 mg, Oral, 2 Times Daily With Meals      risperiDONE 0.25 MG tablet  Commonly known as: risperDAL   0.25 mg, Oral, Nightly         Stop These Medications    ARIPiprazole 2 MG tablet  Commonly known as: ABILIFY     glipizide 5 MG tablet  Commonly known as: GLUCOTROL     lisinopril 10 MG tablet  Commonly known as: PRINIVIL,ZESTRIL            Discharge Diet:    Dietary Orders (From admission, onward)     Start     Ordered    03/08/22 0954  Diet Soft Texture; Ground; Thin  Diet Effective Now        Comments: Meds whole in applesauce. 1:1 supervision with meals and assistance with tray setup.   Question Answer Comment   Diet Texture / Consistency Soft Texture    Select Texture: Ground    Fluid Consistency Thin        03/08/22 0953                  Activity at Discharge:    Increase as tolerated    Discharge Care  Plan/Instructions:   F/u with pcp    Follow-up Appointments:   Future Appointments   Date Time Provider Department Center   4/1/2022 10:00 AM Kodi Jung APRN [facility] None   6/3/2022 11:00 AM Kodi Jung APRN [facility] None   9/2/2022 11:00 AM Kodi Jung APRN [facility] None   3/3/2023 11:00 AM Kodi Jung APRN [facility] None       Test Results Pending at Discharge: none    Electronically signed by Fernando Anthony DO, 03/10/22, 13:13 CST.    Time: 34 minutes

## 2022-03-10 NOTE — DISCHARGE PLACEMENT REQUEST
"    SCOTTY COHN [phone]  Diego Lew (66 y.o. Male)             Date of Birth   1955    Social Security Number       Address   [address] [facility] [address]    Home Phone   [phone]    MRN   [id number]       Mandaeism   Other    Marital Status                               Admission Date   3/7/22    Admission Type   Urgent    Admitting Provider   Fernando Anthony DO    Attending Provider   Fernando Anthony DO    Department, Room/Bed   [facility] 3A, 350/1       Discharge Date       Discharge Disposition       Discharge Destination                               Attending Provider: Fernando Anthony DO    Allergies: No Known Allergies    Isolation: None   Infection: None   Code Status: No CPR   Advance Care Planning Activity    Ht: 170.2 cm (67.01\")   Wt: 77.2 kg (170 lb 3.1 oz)    Admission Cmt: None   Principal Problem: Syncope [R55]                 Active Insurance as of 3/7/2022     Primary Coverage     Payor Plan Insurance Group Employer/Plan Group    HUMANA MEDICARE REPLACEMENT HUMANA MEDICARE REPLACEMENT [id number]     Payor Plan Address Payor Plan Phone Number Payor Plan Fax Number Effective Dates    [address] [address] [phone]  1/1/2022 - None Entered    [facility] [id number]       Subscriber Name Subscriber Birth Date Member ID       DIEGO LEW 1955 [id number]                 Emergency Contacts      (Rel.) Home Phone Work Phone Mobile Phone    Maxine Lew (Spouse) [phone] -- [phone]              Lab Results (last 24 hours)     Procedure Component Value Units Date/Time    POC Glucose Once [[id number]]  (Abnormal) Collected: 03/10/22 0748    Specimen: Blood Updated: 03/10/22 0759     Glucose 182 mg/dL      Comment: : [id number] Gaurav Troy ID: [id number]       POC Glucose Once [[id number]]  (Abnormal) Collected: 03/09/22 2057    Specimen: Blood Updated: 03/09/22 2109     Glucose 186 " mg/dL      Comment: : [id number] Michael JonathanMeter ID: [id number]       POC Glucose Once [[id number]]  (Abnormal) Collected: 03/09/22 1635    Specimen: Blood Updated: 03/09/22 1646     Glucose 204 mg/dL      Comment: : [id number] Benedicto nicoleMeter ID: [id number]       POC Glucose Once [[id number]]  (Abnormal) Collected: 03/09/22 1139    Specimen: Blood Updated: 03/09/22 1152     Glucose 273 mg/dL      Comment: : [id number] Dilshad BrionesaMeter ID: [id number]              Physician Progress Notes (last 24 hours)      Fernando Anthony DO at 03/09/22 1018              [facility]  INPATIENT PROGRESS NOTE    Patient Name: Diego Brizuela  Date of Admission: 3/7/2022  Today's Date: 03/09/22  Length of Stay: 2  Primary Care Physician: Margaux Fisher APRN    Subjective   Chief Complaint: Follow-up syncope/head laceration    HPI   Patient is doing a little better today from a behavior standpoint after increasing Risperdal last night.  He is alert and interactive.  She is confused and oriented to self only.  No new issues overnight.  No complaints.  On room air.      Review of Systems   Unable to assess due to patient factors/non-reliable    Objective    Temp:  [97.5 °F (36.4 °C)-97.9 °F (36.6 °C)] 97.5 °F (36.4 °C)  Heart Rate:  [] 92  Resp:  [16-18] 16  BP: ()/(59-95) 103/85  Physical Exam  GEN: Awake, alert, interactive but confused, in NAD  HEENT: sutured lac above R eye brow, PERRLA, Anicteric, Trachea midline  Lungs: no wheezing/rales/rhonchi  Heart: RRR, +S1/s2, no rub  ABD: soft, nt/nd, +BS, no guarding/rebound  Extremities: atraumatic, no cyanosis, no edema  Skin: no rashes or petechiae  Neuro: AAOx1, العلي extremities, no obvious deficits  Psych: flat affect      Results Review:  I have reviewed the labs, radiology results, and diagnostic studies.    Laboratory Data:   Results from last 7 days   Lab Units 03/08/22  0311   WBC  10*3/mm3 9.00   HEMOGLOBIN g/dL 11.1*   HEMATOCRIT % 31.5*   PLATELETS 10*3/mm3 287        Results from last 7 days   Lab Units 03/08/22  0311   SODIUM mmol/L 137   POTASSIUM mmol/L 3.7   CHLORIDE mmol/L 100   CO2 mmol/L 27.0   BUN mg/dL 8   CREATININE mg/dL 0.51*   CALCIUM mg/dL 9.1   BILIRUBIN mg/dL 0.8   ALK PHOS U/L 100   ALT (SGPT) U/L 22   AST (SGOT) U/L 37   GLUCOSE mg/dL 101*       Culture Data:   No results found for: BLOODCX, URINECX, WOUNDCX, MRSACX, RESPCX, STOOLCX    Radiology Data:   Imaging Results (Last 24 Hours)     ** No results found for the last 24 hours. **          I have reviewed the patient's current medications.     Assessment/Plan     Active Hospital Problems    Diagnosis    • **Syncope    • Laceration of head    • Fall from standing    • Type 2 diabetes mellitus without complication, with long-term current use of insulin (HCC)    • HTN (hypertension)    • Dementia with behavioral problem (HCC)    • Hyperlipemia        Plan:  #1 syncope/fall for standing -patient with reported syncope but essentially was found down at home after a fall from standing height with a head laceration.  No witnessed syncope actually reported.  He was transferred here from outside facility and placed in our unit as precaution due to reports of a brain contusion and bleed as well as a stroke.  However our imaging does not show any of those things.  Patient has been seen by neurology and neurosurgery and cleared.  Doing well.  Transferred out of the unit as he did not require it once those issues were ruled out.  At this point suspect he just probably fell in the middle of night at home while wandering and hit his head.  He worked with speech and passed swallow study without issue.  He has been up and ambulatory with PT and OT.  Doing well.    #2 DM 2 -on sliding scale insulin and hypoglycemia protocol.  Doing well.    #3 head laceration -sutured without issue.  Will need to follow-up for suture removal when  appropriate.    #4 essential hypertension -BP has been stable so far without his home lisinopril.  Monitor.    #5 hyperlipidemia -on statin    #6 early onset dementia with behavioral issues -noted.  He is on Aricept, donepezil, multiple other meds.  Benzodiazepines have been discontinued prior.  He was started on Risperdal.  I increased his withdrawal last night.  Seems to be doing okay with that.  Feels at this time all of his active issues are in the setting of his impulsivity and dementia with need for further med adjustments.      Discharge Planning: Patient needs [facility] placement for ongoing adjustments of his medications.  Is otherwise completely stable from medical standpoint.  Case management trying to work on placement.    Electronically signed by Fernando Anthony DO, 03/09/22, 10:18 CST.      Electronically signed by Fernando Anthony DO at 03/09/22 1534       Trudy Cantu RN    Registered Nurse   Nursing   Plan of Care       Signed   Date of Service:  03/09/22 1730   Creation Time:  03/09/22 1730              Signed              Show:Clear all  [x]Manual[x]Template[]Copied    Added by:  [x]Trudy Cantu RN      []Leno for details    Goal Outcome Evaluation:  Plan of Care Reviewed With: patient  Progress: improving   Pt a/o to self, vss, room air. Pt ambulating x1 assist. Pills in applesauce. Pts urine is pink/red w/ small clots, MD aware, no orders received. Pt is not on sliding scale. No IV present. [employer] done this am with alexandru Bello. Pt up to chair most of the day. Pt ambulated the vidales a couple times. Bed locked, lowest position, side rails x3, call light in reach, bed/chair alarm on.                 Pradip Rodriguez, RN    Registered Nurse   Nursing   Plan of Care       Signed   Date of Service:  03/10/22 0504   Creation Time:  03/10/22 0504              Signed              Show:Clear all  [x]Manual[x]Template[]Copied    Added by:  [x]Pradip Rodriguez RN      []Leno for details    Goal Outcome  Evaluation:   Patient resting poorly throughout the night.  Remains pleasantly confused, but restless intermitt.  Safety sitter at bedside to maintain patient safety.  Urine pink tinged with minimal clots present.  Denies pain.
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
## 2022-03-11 NOTE — THERAPY DISCHARGE NOTE
Acute Care - Physical Therapy Discharge Summary  [facility]       Patient Name: Diego Brizuela  : 1955  MRN: [id number]    Today's Date: 3/11/2022                 Admit Date: 3/7/2022      PT Recommendation and Plan    Visit Dx:    ICD-10-CM ICD-9-CM   1. Dysphagia, unspecified type  R13.10 787.20   2. Impaired mobility  Z74.09 799.89                PT Rehab Goals     Row Name 22 1027             Transfer Goal 1 (PT)    Activity/Assistive Device (Transfer Goal 1, PT) sit-to-stand/stand-to-sit;bed-to-chair/chair-to-bed  -AB      [place] Level/Cues Needed (Transfer Goal 1, PT) supervision required  -AB      Time Frame (Transfer Goal 1, PT) long term goal (LTG);10 days  -AB      Progress/Outcome (Transfer Goal 1, PT) goal not met  -AB              Gait Training Goal 1 (PT)    Activity/Assistive Device (Gait Training Goal 1, PT) gait (walking locomotion);decrease fall risk;improve balance and speed;increase endurance/gait distance  -AB      [place] Level (Gait Training Goal 1, PT) supervision required  -AB      Distance (Gait Training Goal 1, PT) 200 ft  -AB      Time Frame (Gait Training Goal 1, PT) long term goal (LTG);10 days  -AB      Progress/Outcome (Gait Training Goal 1, PT) goal met  -AB              Patient Education Goal (PT)    Activity (Patient Education Goal, PT) Patient will follow 100% of simple 1 step commands without repeated cues.  -AB      [place]/Cues/Accuracy (Memory Goal 2, PT) demonstrates adequately  -AB      Time Frame (Patient Education Goal, PT) long term goal (LTG);10 days  -AB      Progress/Outcome (Patient Education Goal, PT) goal met  -AB            User Key  (r) = Recorded By, (t) = Taken By, (c) = Cosigned By    Initials Name Provider Type Discipline    Karina Ybarra, PTA Physical Therapy Assistant PT                    PT Discharge Summary  Anticipated Discharge Disposition (PT): home with 24/7 care, home with home health, skilled nursing  facility, other (see comments)  Reason for Discharge: Discharge from facility  Outcomes Achieved: Refer to plan of care for updates on goals achieved  Discharge Destination: SNF      Karina Bear, PTA   3/11/2022

## 2022-03-11 NOTE — THERAPY DISCHARGE NOTE
Acute Care - Speech Language Pathology Discharge Summary  [facility]       Patient Name: Diego Brizuela  : 1955  MRN: [id number]    Today's Date: 3/11/2022                   Admit Date: 3/7/2022      SLP Recommendation and Plan  Mechanical soft solids and thin liquids    Visit Dx:    ICD-10-CM ICD-9-CM   1. Dysphagia, unspecified type  R13.10 787.20   2. Impaired mobility  Z74.09 799.89                SLP GOALS     Row Name 22 1200 03/10/22 1215 22 1215       Oral Nutrition/Hydration Goal 1 (SLP)    Oral Nutrition/Hydration Goal 1, SLP Pt will tolerate LRD with no overt s/s of aspiration.  -MB Pt will tolerate LRD with no overt s/s of aspiration.  -TM (r) JR (t) TM (c) Pt will tolerate LRD with no overt s/s of aspiration.  -BN (r) JR (t) BN (c)    Time Frame (Oral Nutrition/Hydration Goal 1, SLP) by discharge  -MB by discharge  -TM (r) JR (t) TM (c) by discharge  -BN (r) JR (t) BN (c)    Barriers (Oral Nutrition/Hydration Goal 1, SLP) Cognitive status  -MB Cognitive status  -TM (r) JR (t) TM (c) Cognitive status  -BN (r) JR (t) BN (c)    Progress/Outcomes (Oral Nutrition/Hydration Goal 1, SLP) goal partially met  -MB continuing progress toward goal;goal met  -TM (r) JR (t) TM (c) continuing progress toward goal  -BN (r) JR (t) BN (c)          User Key  (r) = Recorded By, (t) = Taken By, (c) = Cosigned By    Initials Name Provider Type    Trey Atkinson, CCC-SLP Speech and Language Pathologist    Ramya Castillo, CCC-SLP Speech and Language Pathologist    Iraida Flores, CCC-SLP Speech and Language Pathologist    Marina Torres, Speech Therapy Student Speech Therapy Student                        SLP Discharge Summary  Anticipated Discharge Disposition (SLP): unknown  Reason for Discharge: discharge from this facility  Progress Toward Achieving Short/long Term Goals: goals partially met within established timelines  Discharge Destination: SNF      Trey Vallejo  CCC-SLP  3/11/2022

## 2022-03-11 NOTE — THERAPY DISCHARGE NOTE
Acute Care - Occupational Therapy Discharge Summary  [facility]     Patient Name: Diego Brizuela  : 1955  MRN: [id number]    Today's Date: 3/11/2022                 Admit Date: 3/7/2022        OT Recommendation and Plan    Visit Dx:    ICD-10-CM ICD-9-CM   1. Dysphagia, unspecified type  R13.10 787.20   2. Impaired mobility  Z74.09 799.89                OT Rehab Goals     Row Name 22 0700             Transfer Goal 1 (OT)    Activity/Assistive Device (Transfer Goal 1, OT) sit-to-stand/stand-to-sit;bed-to-chair/chair-to-bed;toilet  -TS      [place] Level/Cues Needed (Transfer Goal 1, OT) supervision required  -TS      Time Frame (Transfer Goal 1, OT) long term goal (LTG);by discharge  -TS      Progress/Outcome (Transfer Goal 1, OT) goal not met  -TS              Dressing Goal 1 (OT)    Activity/Device (Dressing Goal 1, OT) lower body dressing  -TS      [place]/Cues Needed (Dressing Goal 1, OT) supervision required  -TS      Time Frame (Dressing Goal 1, OT) long term goal (LTG);by discharge  -TS      Progress/Outcome (Dressing Goal 1, OT) goal not met  -TS              Problem Specific Goal 1 (OT)    Problem Specific Goal 1 (OT) Pt. will 1 step command 100% of the time to improve his safety & ADL participation  -TS      Time Frame (Problem Specific Goal 1, OT) long term goal (LTG);by discharge  -TS      Progress/Outcome (Problem Specific Goal 1, OT) goal not met  -TS            User Key  (r) = Recorded By, (t) = Taken By, (c) = Cosigned By    Initials Name Provider Type Discipline    TS Elli Hunt COTA Occupational Therapy Assistant OT                            OT Discharge Summary  Anticipated Discharge Disposition (OT): skilled nursing facility  Reason for Discharge: Discharge from facility  Outcomes Achieved: Refer to plan of care for updates on goals achieved  Discharge Destination: SNF      HOMA Pineda  3/11/2022

## 2024-08-12 NOTE — NURSING NOTE
Dietary personel exiting the unit on the ismael side when the Pt was observed walking out behind dietary personel. Signee assisted the Pt out of the bay without issue. Pt remains confused and alert to self only.    unlabored